# Patient Record
Sex: FEMALE | Race: WHITE | Employment: FULL TIME | URBAN - METROPOLITAN AREA
[De-identification: names, ages, dates, MRNs, and addresses within clinical notes are randomized per-mention and may not be internally consistent; named-entity substitution may affect disease eponyms.]

---

## 2022-05-17 ENCOUNTER — HOSPITAL ENCOUNTER (OUTPATIENT)
Dept: PHYSICAL THERAPY | Age: 32
Discharge: HOME | End: 2022-05-17
Payer: COMMERCIAL

## 2022-05-17 PROCEDURE — 97161 PT EVAL LOW COMPLEX 20 MIN: CPT

## 2022-05-17 NOTE — PROGRESS NOTES
W . [address] [place] [address], [address]  Phone: [phone]   Fax: [phone]    PT INITIAL EVALUATION NOTE - MCR 2-15  Plan of Care/Statement of Necessity for Physical Therapy Services  2-15    Patient Name: Ab Leach  Date:2022  : 1990  [x]  Patient  Verified  Provider#: [id number]  Payor: Troy Savage / Plan: [facility] [address] / Product Type: PPO /    Referral source: Vidal Bhakta MD   In time:330  Out time:430  Total Treatment Time (min): 60  Total Timed Codes (min): 0     Visit #: 1      Start of Care: 22      Onset Date: surgery 21     Treatment Area/Diagnosis: Pain in right foot [M79.671]    SUBJECTIVE  Pain Level (0-10 scale): 5                Best: 2           Worst:  8  Description: poor ADL TOLERANCE. DEPENDENT ON BOOT. BURNS ALONG POST. TIB. HAVE TO BE ON LITE DUTY WORK. 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 Living Situation:  AND KIDS   Pt Goals: TOLERATE [address]RAIN, FULL WORK, NO BOOT  Barriers: NONE  Motivation: WNL  Substance use: NONE  FABQ Score: AMPAC=0%  Cognition: A & O x WNL        OBJECTIVE/EXAMINATION  TALL. LEAN. POSTURE WNL. CAM BOOT. SOME ATROPHY RIGHT CALF. NAVICULAR PROTRUDING RIGHT ARCH NOT LEFT. HEALED SCARS BUT HYPERSENSITIVE TO TOUCH. ARCH NORMAL ON LEFT, MILDLY DROOPING ON RIGHT. MMT LEFT FOOT WNL. RIGHT FOOT 3+/5. ROM PL. FLEX INVERSION/EVERSION ABOUT THE SAME AS LEFT.  DORSIFLEX UNABLE TO TOLERATE FULL TEST BUT HAS SOME PAST NEUTRAL. SUPINE; NO LLD. KNEE ALIGNMENT WNL. PROE; PROM HIPS AND KNEES FULL WNL. WALK ON TOES MARGINAL ON RIGHT. WALK ON HEELS MARGINAL ON RIGHT. WNL LEFT. NO DISCOLORATION. NO EDEMA. With   [x] TE   [] TA   [] neuro   [] other: Patient Education: [x] Review HEP  DAILY AFTER WORK: NWB ABCs; STAND SUPPORTED MARCH R/L 1 MIN.; LIGHT RETROGRADE FOOT SCAR MASSAGE (LIKE FOR LYMPHEDEMA)[] Progressed/Changed HEP based on:   [] positioning   [] body mechanics   [] transfers   [] heat/ice application    [] other:        TUG: NORMAL IN BOOT    Pain Level (0-10 scale) post treatment: 5    ASSESSMENT/Key Information/Changes in Function:   IN BOOT YR AND HALF. UNABLE SO FAR TO GET ENOUGH FOOT/ANKLE STRENGTH/ENDURANCE TO [address] AND RN IN CICU. Problem List: pain affecting function, decrease ROM, decrease strength, edema affecting function, impaired gait/ balance, decrease ADL/ functional abilitiies, decrease activity tolerance, decrease flexibility/ joint mobility and decrease transfer abilities    Short Term Goals: To be accomplished in 4 weeks:   KEEP PAIN RATINGS <6/10              TOLERATE WORKING W/O BOOT BUT STILL IN BOOT OUTSIDE WORK  Long Term Goals: To be accomplished in 8 weeks:   [address]. FULL WORK NOT LITE DUTY. Patient Goal (s): FULL RETURN TO WORK.  WALK ON UNEVEN TERRAIN WFL    Evaluation Complexity History LOW Complexity : Zero comorbidities / personal factors that will impact the outcome / POC; Examination MEDIUM Complexity : 3 Standardized tests and measures addressing body structure, function, activity limitation and / or participation in recreation  ;Presentation LOW Complexity : Stable, uncomplicated  ;Clinical Decision Making LOW Complexity : FOTO score of   Overall Complexity Rating: MEDIUM     Patient / Family readiness to learn indicated by: asking questions  Persons(s) to be included in education: patient (P)  Barriers to Learning/Limitations: None  Patient Self Reported Health Status: good  Rehabilitation Potential: good  Patient/ Caregiver education and instruction: brace/ splint application and exercises      PLAN:  Treatment Plan may include any combination of the following: Therapeutic exercise, Therapeutic activities, Physical agent/modality, Gait/balance training and Manual therapy  HEP Issued: SEE ABOVE    Frequency / Duration: Patient to be seen 2 times per week for 8 weeks. [x]  Plan of care has been reviewed with PTA    Certification Period: 5/17/22  to  8/16/22    Matthew Redd, PT 5/17/2022     ________________________________________________________________________    I certify that the above Therapy Services are being furnished while the patient is under my care. I agree with the treatment plan and certify that this therapy is necessary.     Physician's Signature:____________________  Date:____________Time: _________            Wyatt Villagomez MD

## 2022-05-25 ENCOUNTER — HOSPITAL ENCOUNTER (OUTPATIENT)
Dept: PHYSICAL THERAPY | Age: 32
Discharge: HOME | End: 2022-05-25
Payer: COMMERCIAL

## 2022-05-25 PROCEDURE — 97110 THERAPEUTIC EXERCISES: CPT

## 2022-05-25 NOTE — PROGRESS NOTES
PT DAILY TREATMENT NOTE - [facility] 2-15    Patient Name: Jourdan Gregg  Date:2022  : 1990  [x]  Patient  Verified  Payor: Joellen Dent / Plan: [employer] [facility] [address] / Product Type: PPO /    In time:2pm  Out time:245pm  Total Treatment Time (min): 45  Total Timed Codes (min): 39   Visit #:  2    Treatment Area: Pain in right foot [W04.272]    SUBJECTIVE  Pain Level (0-10 scale): 5  Any medication changes, allergies to medications, adverse drug reactions, diagnosis change, or new procedure performed?: [x] No    [] Yes (see summary sheet for update)  Subjective functional status/changes:   [] No changes reported  Patient states that she wears her boot when shes up walking around. She states that her foot hurts the most when shes walking.     OBJECTIVE    Modality rationale: decrease edema, decrease inflammation and decrease pain to improve the patients ability to complete ADLs   Min Type Additional Details       [] Estim: []Att   []Unatt    []TENS instruct                  []IFC  []Premod   []NMES                     []Other:  []w/US   []w/ice   []w/heat  Position:  Location:       []  Traction: [] Cervical       []Lumbar                       [] Prone          []Supine                       []Intermittent   []Continuous Lbs:  [] before manual  [] after manual  []w/heat    []  Ultrasound: []Continuous   [] Pulsed                       at: []1MHz   []3MHz Location:  W/cm2:    [] Paraffin         Location:   []w/heat    []  Ice     []  Heat  []  Ice massage Position:  Location:    []  Laser  []  Other: Position:  Location:      []  Vasopneumatic Device Pressure:       [] lo [] med [] hi   Temperature:      [x] Skin assessment post-treatment:  [x]intact []redness- no adverse reaction    []redness - adverse reaction:      39 min Therapeutic Exercise:  [x] See flow sheet :   Rationale: increase ROM and increase strength to improve the patients ability to complete ADLs     min Manual Therapy: Rationale: decrease pain, increase ROM and increase tissue extensibility to improve the patients ability to complete ADLs. With   [] TE   [] TA   [] neuro   [] other: Patient Education: [x] Review HEP    [] Progressed/Changed HEP based on:   [] positioning   [] body mechanics   [] transfers   [] heat/ice application    [] other:      Other Objective/Functional Measures: foot supination on the right side with gait. Pain Level (0-10 scale) post treatment: 2    ASSESSMENT/Changes in Function:   Patient tolerated session well today with focus on strengthening intrinsics within her foot. She was able to complete a smooth heel/toe gait while using the upwalker at 50% WB and slowed priyanka. Keep work loads to 50% to reduce prolonged discomfort. Patient was given HEP and was able to verbalize and demonstrate understanding. Patient will continue to benefit from skilled PT services to modify and progress therapeutic interventions, address functional mobility deficits, address ROM deficits, address strength deficits and analyze and address soft tissue restrictions to attain remaining goals. [x]  See Plan of Care  []  See progress note/recertification  []  See Discharge Summary         Progress towards goals / Updated goals:  Problem List: pain affecting function, decrease ROM, decrease strength, edema affecting function, impaired gait/ balance, decrease ADL/ functional abilitiies, decrease activity tolerance, decrease flexibility/ joint mobility and decrease transfer abilities     Short Term Goals: To be accomplished in 4 weeks:              KEEP PAIN RATINGS <6/10              TOLERATE WORKING W/O BOOT BUT STILL IN BOOT OUTSIDE WORK  Long Term Goals: To be accomplished in 8 weeks:              [address]. FULL WORK NOT LITE DUTY. Patient Goal (s): FULL RETURN TO WORK.  WALK ON UNEVEN TERRAIN WFL    PLAN  [x]  Upgrade activities as tolerated     [x]  Continue plan of care  []  Update interventions per flow sheet       []  Discharge due to:_  []  Other:_      Klever Tate., PTA 5/25/2022

## 2022-05-31 ENCOUNTER — HOSPITAL ENCOUNTER (OUTPATIENT)
Dept: PHYSICAL THERAPY | Age: 32
Discharge: HOME | End: 2022-05-31
Payer: COMMERCIAL

## 2022-05-31 PROCEDURE — 97110 THERAPEUTIC EXERCISES: CPT

## 2022-05-31 PROCEDURE — 97140 MANUAL THERAPY 1/> REGIONS: CPT

## 2022-05-31 NOTE — PROGRESS NOTES
PT DAILY TREATMENT NOTE - [facility] 2-15    Patient Name: Landen Giraldo  Date:2022  : 1990  [x]  Patient  Verified  Payor: Kali  / Plan: ERVIN GRACIA [facility] [address] / Product Type: PPO /    In time:245 pm  Out time:330  pm  Total Treatment Time (min): 45  Total Timed Codes (min): 45  Visit #:  3    Treatment Area: Pain in right foot [M79.851]    SUBJECTIVE  Pain Level (0-10 scale): 5  Any medication changes, allergies to medications, adverse drug reactions, diagnosis change, or new procedure performed?: [x] No    [] Yes (see summary sheet for update)  Subjective functional status/changes:   [] No changes reported  Patient states that she wears her boot when shes up walking around. She states that her foot hurts the most when shes walking. Came into PT today in street shoe; slow and mild limp. OBJECTIVE         10 min Therapeutic Exercise:  [x] See flow sheet :   Rationale: increase ROM and increase strength to improve the patients ability to complete ADLs    35 min Manual Therapy:     Rationale: decrease pain, increase ROM and increase tissue extensibility to improve the patients ability to complete ADLs. With   [x] TE   [] TA   [] neuro   [] other: Patient Education: [x] Review HEP  Looking into getting Aircast; arom, towel scrunches, marble pickups  [] Progressed/Changed HEP based on:   [] positioning   [] body mechanics   [] transfers   [] heat/ice application    [] other:        Pain Level (0-10 scale) post treatment: 3    ASSESSMENT/Changes in Function:   Manually resisted toe/foot /ankle exercises went well after she got warmed up. Click palpable if she goes towards full dorsiflexion. Patient will continue to benefit from skilled PT services to modify and progress therapeutic interventions, address functional mobility deficits, address ROM deficits, address strength deficits and analyze and address soft tissue restrictions to attain remaining goals.      Progress towards goals / Updated goals:  Problem List: pain affecting function, decrease ROM, decrease strength, edema affecting function, impaired gait/ balance, decrease ADL/ functional abilitiies, decrease activity tolerance, decrease flexibility/ joint mobility and decrease transfer abilities     Short Term Goals: To be accomplished in 4 weeks:              KEEP PAIN RATINGS <6/10              TOLERATE WORKING W/O BOOT BUT STILL IN BOOT OUTSIDE WORK  Long Term Goals: To be accomplished in 8 weeks:              [address]. FULL WORK NOT LITE DUTY. Patient Goal (s): FULL RETURN TO WORK.  WALK ON [address] L    PLAN  [x]  Upgrade activities as tolerated     [x]  Continue plan of care  []  Update interventions per flow sheet       []  Discharge due to:_  []  Other:_      Any Grullon, PT 5/31/2022

## 2022-06-03 ENCOUNTER — HOSPITAL ENCOUNTER (OUTPATIENT)
Dept: PHYSICAL THERAPY | Age: 32
Discharge: HOME | End: 2022-06-03
Payer: COMMERCIAL

## 2022-06-03 PROCEDURE — 97110 THERAPEUTIC EXERCISES: CPT

## 2022-06-03 NOTE — PROGRESS NOTES
PT DAILY TREATMENT NOTE - [facility] 2-15    Patient Name: Debbi Tirado  Date:6/3/2022  : 1990  [x]  Patient  Verified  Payor: Pernell Gregg / Plan: [employer] [address] / Product Type: PPO /    In time:230 pm  Out time: 315 pm  Total Treatment Time (min): 45  Total Timed Codes (min): 39  Visit #:  4    Treatment Area: Pain in right foot [M79.672]    SUBJECTIVE  Pain Level (0-10 scale): 4  Any medication changes, allergies to medications, adverse drug reactions, diagnosis change, or new procedure performed?: [x] No    [] Yes (see summary sheet for update)  Subjective functional status/changes:   [] No changes reported  Patient states that she wears her boot when shes up walking around. She states that her foot hurts the most when shes walking. Came into PT today in street shoe; slow and mild limp. OBJECTIVE         39 min Therapeutic Exercise:  [x] See flow sheet :   Rationale: increase ROM and increase strength to improve the patients ability to complete ADLs     min Manual Therapy:     Rationale: decrease pain, increase ROM and increase tissue extensibility to improve the patients ability to complete ADLs. With   [x] TE   [] TA   [] neuro   [] other: Patient Education: [x] Review HEP  Looking into getting Aircast; arom, towel scrunches, marble pickups  [] Progressed/Changed HEP based on:   [] positioning   [] body mechanics   [] transfers   [] heat/ice application    [] other:        Pain Level (0-10 scale) post treatment: 6    ASSESSMENT/Changes in Function:   Pt tolerated session well today, had mild discomfort with a few exercises but was able to recover and continue session. Pt declined ice post session, and reported her pain to be higher as she walked.   Patient will continue to benefit from skilled PT services to modify and progress therapeutic interventions, address functional mobility deficits, address ROM deficits, address strength deficits and analyze and address soft tissue restrictions to attain remaining goals. Progress towards goals / Updated goals:  Problem List: pain affecting function, decrease ROM, decrease strength, edema affecting function, impaired gait/ balance, decrease ADL/ functional abilitiies, decrease activity tolerance, decrease flexibility/ joint mobility and decrease transfer abilities     Short Term Goals: To be accomplished in 4 weeks:              KEEP PAIN RATINGS <6/10              TOLERATE WORKING W/O BOOT BUT STILL IN BOOT OUTSIDE WORK  Long Term Goals: To be accomplished in 8 weeks:              [address]. FULL WORK NOT LITE DUTY. Patient Goal (s): FULL RETURN TO WORK.  WALK ON [address] L    PLAN  [x]  Upgrade activities as tolerated     [x]  Continue plan of care  []  Update interventions per flow sheet       []  Discharge due to:_  []  Other:_      Nataly Florez., PTA 6/3/2022

## 2022-06-14 ENCOUNTER — APPOINTMENT (OUTPATIENT)
Dept: PHYSICAL THERAPY | Age: 32
End: 2022-06-14
Payer: COMMERCIAL

## 2022-06-22 ENCOUNTER — APPOINTMENT (OUTPATIENT)
Dept: PHYSICAL THERAPY | Age: 32
End: 2022-06-22
Payer: COMMERCIAL

## 2022-06-27 ENCOUNTER — HOSPITAL ENCOUNTER (OUTPATIENT)
Dept: PHYSICAL THERAPY | Age: 32
Discharge: HOME | End: 2022-06-27
Payer: COMMERCIAL

## 2022-06-27 PROCEDURE — 97110 THERAPEUTIC EXERCISES: CPT

## 2022-06-27 NOTE — PROGRESS NOTES
PT DAILY TREATMENT NOTE - [facility] 2-15    Patient Name: Satnana Douglas  Date:2022  : 1990  [x]  Patient  Verified  Payor: Merline Grow / Plan: Jocelyn Cortez / Product Type: PPO /    In time:417 pm  Out time: 5 pm  Total Treatment Time (min): 43  Total Timed Codes (min): 35  Visit #:  5    Treatment Area: Pain in right foot [M79.284]    SUBJECTIVE  Pain Level (0-10 scale): 3 at rest, 7 walking  Any medication changes, allergies to medications, adverse drug reactions, diagnosis change, or new procedure performed?: [x] No    [] Yes (see summary sheet for update)  Subjective functional status/changes:   [] No changes reported  Pt states that recently she has been having pain at rest which last for a few hours. She reports that her air cast hasnt made her foot pain any better. OBJECTIVE         35 min Therapeutic Exercise:  [x] See flow sheet :   Rationale: increase ROM and increase strength to improve the patients ability to complete ADLs     min Manual Therapy:     Rationale: decrease pain, increase ROM and increase tissue extensibility to improve the patients ability to complete ADLs. With   [x] TE   [] TA   [] neuro   [] other: Patient Education: [x] Review HEP  Looking into getting Aircast; arom, towel scrunches, marble pickups  [] Progressed/Changed HEP based on:   [] positioning   [] body mechanics   [] transfers   [] heat/ice application    [] other:        Pain Level (0-10 scale) post treatment: 7 walking    ASSESSMENT/Changes in Function:   Pt arrived to therapy with air cast and was able to complete exercises without any difficulty while wearing it. Pt was limited during exercises due to muscular fatigue and discomfort. Pt educated about continuing HEP and to manage how much activity she completes at home after work.  Patient will continue to benefit from skilled PT services to modify and progress therapeutic interventions, address functional mobility deficits, address ROM deficits, address strength deficits and analyze and address soft tissue restrictions to attain remaining goals. Progress towards goals / Updated goals:  Problem List: pain affecting function, decrease ROM, decrease strength, edema affecting function, impaired gait/ balance, decrease ADL/ functional abilitiies, decrease activity tolerance, decrease flexibility/ joint mobility and decrease transfer abilities     Short Term Goals: To be accomplished in 4 weeks:              KEEP PAIN RATINGS <6/10              TOLERATE WORKING W/O BOOT BUT STILL IN BOOT OUTSIDE WORK  Long Term Goals: To be accomplished in 8 weeks:              [address]. FULL WORK NOT LITE DUTY. Patient Goal (s): FULL RETURN TO WORK.  WALK ON [address] WFL    PLAN  [x]  Upgrade activities as tolerated     [x]  Continue plan of care  []  Update interventions per flow sheet       []  Discharge due to:_  []  Other:_      Brook Sanders., PTA 6/27/2022

## 2022-07-01 ENCOUNTER — APPOINTMENT (OUTPATIENT)
Dept: PHYSICAL THERAPY | Age: 32
End: 2022-07-01
Payer: COMMERCIAL

## 2022-07-06 ENCOUNTER — HOSPITAL ENCOUNTER (OUTPATIENT)
Dept: PHYSICAL THERAPY | Age: 32
Discharge: HOME | End: 2022-07-06
Payer: COMMERCIAL

## 2022-07-13 ENCOUNTER — HOSPITAL ENCOUNTER (OUTPATIENT)
Dept: PHYSICAL THERAPY | Age: 32
Discharge: HOME | End: 2022-07-13
Payer: COMMERCIAL

## 2022-07-13 PROCEDURE — 97035 APP MDLTY 1+ULTRASOUND EA 15: CPT | Performed by: PHYSICAL THERAPIST

## 2022-07-13 PROCEDURE — 97140 MANUAL THERAPY 1/> REGIONS: CPT | Performed by: PHYSICAL THERAPIST

## 2022-07-13 PROCEDURE — 97110 THERAPEUTIC EXERCISES: CPT | Performed by: PHYSICAL THERAPIST

## 2022-07-13 NOTE — PROGRESS NOTES
PT DAILY TREATMENT NOTE - [facility] 2-15    Patient Name: Jamar Florez  Date:2022  : 1990  [x]  Patient  Verified  Payor: Marya Ornelas / Plan: [employer] / Product Type: PPO /    In time:415 pm  Out time: 515 pm  Total Treatment Time (min): 50  Total Timed Codes (min): 43  Visit #:  8  Treatment Area: Pain in right foot [M28.637]    SUBJECTIVE  Pain Level (0-10 scale): 4 walking, 0 at rest.   Any medication changes, allergies to medications, adverse drug reactions, diagnosis change, or new procedure performed?: [x] No    [] Yes (see summary sheet for update)  Subjective functional status/changes:   [] No changes reported    OBJECTIVE              Modality rationale: decrease edema and decrease pain to improve the patients ability to move with greater comfort. Min Type Additional Details      0 []? Estim: []? Att   []? Unatt    [x]? TENS instruct                  []?IFC  []? Premod   []? NMES                     []?Other:  []?w/US   []?w/ice   []?w/heat  Position: reclined  Location: right anterior thigh.           []? Traction: []? Cervical       []? Lumbar                       []? Prone          []? Supine                       []?Intermittent   []? Continuous Lbs:  []? before manual  []? after manual  []?w/heat    6 [x]? Ultrasound: [x]? Continuous   []? Pulsed                       at: [x]? 1MHz   []? 3MHz Location:  Medial ankle  W/cm2: 1.2     []? Paraffin         Location:   []?w/heat    [x]? Ice     []? Heat  []? Ice massage Position:  reclined  Location: right anterior hip      []? Laser  []? Other: Position:  Location:        []? Vasopneumatic Device Pressure:       []? lo []? med []? hi   Temperature:       [x]? Skin assessment post-treatment:  [x]? intact []? redness- no adverse reaction    []? redness - adverse reaction:     26 min Therapeutic Exercise:  [x] See flow sheet :   Rationale: increase ROM and increase strength to improve the patients ability to complete ADLs    15 min Manual Therapy:  Joint mobs: metatarsals/TC, midfoot, calcaneous. STM over scar area. Rationale: decrease pain, increase ROM and increase tissue extensibility to improve the patients ability to complete ADLs. With   [x] TE   [] TA   [] neuro   [] other: Patient Education: [x] Review HEP  Clam 1 and 2, bridges, SL hip abd, ball/band. Standing hip rolls toward the right. [] Progressed/Changed HEP based on:   [] positioning   [] body mechanics   [] transfers   [] heat/ice application    [] other:        Pain Level (0-10 scale) post treatment: 6 walking    Note:  Decrease TC posterior glide and calcaneal IV/EV    ASSESSMENT/Changes in Function:   Ortho appointment moved up to August 11, 2022. Program modification to include hip strengthening. Modest increase in ankle soreness s/p program today. Patient will continue to benefit from skilled PT services to modify and progress therapeutic interventions, address functional mobility deficits, address ROM deficits, address strength deficits and analyze and address soft tissue restrictions to attain remaining goals. Progress towards goals / Updated goals:  Problem List: pain affecting function, decrease ROM, decrease strength, edema affecting function, impaired gait/ balance, decrease ADL/ functional abilitiies, decrease activity tolerance, decrease flexibility/ joint mobility and decrease transfer abilities     Short Term Goals: To be accomplished in 4 weeks:              KEEP PAIN RATINGS <6/10              TOLERATE WORKING W/O BOOT BUT STILL IN BOOT OUTSIDE WORK  Long Term Goals: To be accomplished in 8 weeks:              [address]. FULL WORK NOT LITE DUTY. Patient Goal (s): FULL RETURN TO WORK.  WALK ON [address] WFL    PLAN  [x]  Upgrade activities as tolerated     [x]  Continue plan of care  []  Update interventions per flow sheet       []  Discharge due to:_  []  Other:_    Patient was informed and agreed to allow student to observe and participate in medical care. Patient was seen by student and licensed therapist who is in agreement with the above documentation.     Terrence Merlin, [place] 7/6/2022  Hannah Fonseca PT 7/13/2022

## 2022-07-22 ENCOUNTER — HOSPITAL ENCOUNTER (OUTPATIENT)
Dept: PHYSICAL THERAPY | Age: 32
Discharge: HOME | End: 2022-07-22
Payer: COMMERCIAL

## 2022-07-22 PROCEDURE — 97110 THERAPEUTIC EXERCISES: CPT

## 2022-07-22 PROCEDURE — 97035 APP MDLTY 1+ULTRASOUND EA 15: CPT

## 2022-07-22 NOTE — PROGRESS NOTES
PT DAILY TREATMENT NOTE - [facility] 2-15    Patient Name: Joellen Alcocer  Date:2022  : 1990  [x]  Patient  Verified  Payor: Chloe Patterson / Plan: [employer] [facility] [address] / Product Type: PPO /    In time:400 pm  Out time: 445 pm  Total Treatment Time (min): 45  Total Timed Codes (min): 40  Visit #:  9  Treatment Area: Pain in right foot [M79.752]    SUBJECTIVE  Pain Level (0-10 scale): 6 walking, 0 at rest.   Any medication changes, allergies to medications, adverse drug reactions, diagnosis change, or new procedure performed?: [x] No    [] Yes (see summary sheet for update)  Subjective functional status/changes:   [] No changes reported  Pain in arch especially with walking sideways. Doing hip exercises at home. OBJECTIVE            Modality rationale: decrease edema and decrease pain to improve the patients ability to move with greater comfort. Min Type Additional Details      0 [] Estim: []Att   []Unatt    [x]TENS instruct                  []IFC  []Premod   []NMES                     []Other:  []w/US   []w/ice   []w/heat  Position: reclined  Location: right anterior thigh.            []  Traction: [] Cervical       []Lumbar                       [] Prone          []Supine                       []Intermittent   []Continuous Lbs:  [] before manual  [] after manual  []w/heat    8 [x]  Ultrasound: [x]Continuous   [] Pulsed                       at: [x]1MHz   []3MHz Location:  Medial ankle  W/cm2: 1.2     [] Paraffin         Location:   []w/heat    [x]  Ice     []  Heat  []  Ice massage Position:  reclined  Location: right anterior hip      []  Laser  []  Other: Position:  Location:        []  Vasopneumatic Device Pressure:       [] lo [] med [] hi   Temperature:       [x] Skin assessment post-treatment:  [x]intact []redness- no adverse reaction    []redness - adverse reaction:     32 min Therapeutic Exercise:  [x] See flow sheet :   Rationale: increase ROM and increase strength to improve the patients ability to complete ADLs     min Manual Therapy:  Joint mobs: metatarsals/TC, midfoot, calcaneous. STM over scar area. Rationale: decrease pain, increase ROM and increase tissue extensibility to improve the patients ability to complete ADLs. With   [x] TE   [] TA   [] neuro   [] other: Patient Education: [x] Review HEP  Clam 1 and 2, bridges, SL hip abd, ball/band. Standing hip rolls toward the right. [] Progressed/Changed HEP based on:   [] positioning   [] body mechanics   [] transfers   [] heat/ice application    [] other:        Pain Level (0-10 scale) post treatment: 5 walking    ASSESSMENT/Changes in Function:   Ortho appointment moved up to August 11, 2022. Tender along medial ankle/foot. Modest increase in ankle soreness s/p program today. Patient will continue to benefit from skilled PT services to modify and progress therapeutic interventions, address functional mobility deficits, address ROM deficits, address strength deficits and analyze and address soft tissue restrictions to attain remaining goals. Progress towards goals / Updated goals:  Problem List: pain affecting function, decrease ROM, decrease strength, edema affecting function, impaired gait/ balance, decrease ADL/ functional abilitiies, decrease activity tolerance, decrease flexibility/ joint mobility and decrease transfer abilities     Short Term Goals: To be accomplished in 4 weeks:              KEEP PAIN RATINGS <6/10              TOLERATE WORKING W/O BOOT BUT STILL IN BOOT OUTSIDE WORK  Long Term Goals: To be accomplished in 8 weeks:              [address]. FULL WORK NOT LITE DUTY. Patient Goal (s): FULL RETURN TO WORK.  WALK ON [address] TERRAIN WFL    PLAN  [x]  Upgrade activities as tolerated     [x]  Continue plan of care  []  Update interventions per flow sheet       []  Discharge due to:_  []  Other:_      Davis Landau, PT 7/22/2022

## 2022-07-27 ENCOUNTER — APPOINTMENT (OUTPATIENT)
Dept: PHYSICAL THERAPY | Age: 32
End: 2022-07-27
Payer: COMMERCIAL

## 2022-08-05 ENCOUNTER — APPOINTMENT (OUTPATIENT)
Dept: PHYSICAL THERAPY | Age: 32
End: 2022-08-05

## 2022-10-27 NOTE — THERAPY DISCHARGE
[address], [address] [facility]  Phone: [phone]   Fax: [phone]    Discharge Summary  2-15    Patient name: Mary Simon  : 1990  Provider#: [id number]  Referral source: Daniel Johnston MD      Medical/Treatment Diagnosis: Pain in right foot [M79.671]     Prior Hospitalization: see medical history     Comorbidities: See Plan of Care  Prior Level of Function:See Plan of Care  Medications: Verified on Patient Summary List    Start of Care: 22      Onset Date:21   Visits from Start of Care: 9     Missed Visits: LOST TO F/UP  Reporting Period : 22 to 10/27/22      ASSESSMENT/SUMMARY OF CARE: LOST TO FOLLOW UP; STATUS UNKNOWN    FOTO Functional Measure: 0/100  Discharge  0/100  Intake  IN SUMMARY; SOME PROGRESS WITH AIRCAST BUT GOALS NOT ACHIEVED:  Short Term Goals: To be accomplished in 4 weeks:              KEEP PAIN RATINGS <6/10              TOLERATE WORKING W/O BOOT BUT STILL IN BOOT OUTSIDE WORK  Long Term Goals: To be accomplished in 8 weeks:              [address]. FULL WORK NOT LITE DUTY. Patient Goal (s): FULL RETURN TO WORK.  WALK ON [address] TERRAIN L      RECOMMENDATIONS:  [x]Discontinue therapy: []Patient has reached or is progressing toward set goals      []Patient is non-compliant or has abdicated      []Due to lack of appreciable progress towards set goals      [x]Other   LOST TO F/UP    Roel Rouse, PT 10/27/2022

## 2023-01-11 RX ORDER — ESCITALOPRAM OXALATE 5 MG/1
5 TABLET ORAL DAILY
COMMUNITY

## 2023-01-11 RX ORDER — TRAZODONE HYDROCHLORIDE 50 MG/1
50 TABLET ORAL
COMMUNITY

## 2023-01-16 ENCOUNTER — ANESTHESIA (OUTPATIENT)
Dept: SURGERY | Age: 33
End: 2023-01-16
Payer: COMMERCIAL

## 2023-01-16 ENCOUNTER — ANESTHESIA EVENT (OUTPATIENT)
Dept: SURGERY | Age: 33
End: 2023-01-16
Payer: COMMERCIAL

## 2023-01-16 ENCOUNTER — HOSPITAL ENCOUNTER (OUTPATIENT)
Age: 33
Discharge: HOME | End: 2023-01-16
Attending: ORTHOPAEDIC SURGERY | Admitting: ORTHOPAEDIC SURGERY
Payer: COMMERCIAL

## 2023-01-16 VITALS
DIASTOLIC BLOOD PRESSURE: 72 MMHG | OXYGEN SATURATION: 97 % | HEIGHT: 69 IN | SYSTOLIC BLOOD PRESSURE: 124 MMHG | RESPIRATION RATE: 16 BRPM | TEMPERATURE: 98.4 F | HEART RATE: 83 BPM | BODY MASS INDEX: 25.18 KG/M2 | WEIGHT: 170 LBS

## 2023-01-16 DIAGNOSIS — Q74.2 ACCESSORY NAVICULAR BONE OF RIGHT FOOT: Primary | ICD-10-CM

## 2023-01-16 LAB — HCG UR QL: NEGATIVE

## 2023-01-16 PROCEDURE — C1713 ANCHOR/SCREW BN/BN,TIS/BN: HCPCS | Performed by: ORTHOPAEDIC SURGERY

## 2023-01-16 PROCEDURE — 74011000250 HC RX REV CODE- 250: Performed by: NURSE ANESTHETIST, CERTIFIED REGISTERED

## 2023-01-16 PROCEDURE — 77030008684 HC TU ET CUF COVD -B: Performed by: ANESTHESIOLOGY

## 2023-01-16 PROCEDURE — 74011000250 HC RX REV CODE- 250: Performed by: ORTHOPAEDIC SURGERY

## 2023-01-16 PROCEDURE — 81025 URINE PREGNANCY TEST: CPT

## 2023-01-16 PROCEDURE — 77030039135 HC KT DISP FIBRTAK ARTH -D: Performed by: ORTHOPAEDIC SURGERY

## 2023-01-16 PROCEDURE — 76060000035 HC ANESTHESIA 2 TO 2.5 HR: Performed by: ORTHOPAEDIC SURGERY

## 2023-01-16 PROCEDURE — 74011250636 HC RX REV CODE- 250/636: Performed by: NURSE ANESTHETIST, CERTIFIED REGISTERED

## 2023-01-16 PROCEDURE — 77030018074 HC RTVR SUT ARTH4 S&N -B: Performed by: ORTHOPAEDIC SURGERY

## 2023-01-16 PROCEDURE — 77030040361 HC SLV COMPR DVT MDII -B: Performed by: ORTHOPAEDIC SURGERY

## 2023-01-16 PROCEDURE — 76010000131 HC OR TIME 2 TO 2.5 HR: Performed by: ORTHOPAEDIC SURGERY

## 2023-01-16 PROCEDURE — 77030025304 HC SUT FBRWRE 1 ARTH -B: Performed by: ORTHOPAEDIC SURGERY

## 2023-01-16 PROCEDURE — 2709999900 HC NON-CHARGEABLE SUPPLY: Performed by: ORTHOPAEDIC SURGERY

## 2023-01-16 PROCEDURE — 74011250637 HC RX REV CODE- 250/637: Performed by: ORTHOPAEDIC SURGERY

## 2023-01-16 PROCEDURE — 77030026438 HC STYL ET INTUB CARD -A: Performed by: ANESTHESIOLOGY

## 2023-01-16 PROCEDURE — 77030031139 HC SUT VCRL2 J&J -A: Performed by: ORTHOPAEDIC SURGERY

## 2023-01-16 PROCEDURE — 74011250636 HC RX REV CODE- 250/636: Performed by: ORTHOPAEDIC SURGERY

## 2023-01-16 PROCEDURE — 76210000006 HC OR PH I REC 0.5 TO 1 HR: Performed by: ORTHOPAEDIC SURGERY

## 2023-01-16 PROCEDURE — 74011250636 HC RX REV CODE- 250/636: Performed by: ANESTHESIOLOGY

## 2023-01-16 PROCEDURE — 77030013079 HC BLNKT BAIR HGGR 3M -A: Performed by: ANESTHESIOLOGY

## 2023-01-16 PROCEDURE — 76210000022 HC REC RM PH II 1.5 TO 2 HR: Performed by: ORTHOPAEDIC SURGERY

## 2023-01-16 DEVICE — IMPLANTABLE DEVICE: Type: IMPLANTABLE DEVICE | Site: ANKLE | Status: FUNCTIONAL

## 2023-01-16 DEVICE — DEVICE GRFT FIX FOR LIGMNT AUG ANCHR REP PEEK INT BRAC: Type: IMPLANTABLE DEVICE | Site: ANKLE | Status: FUNCTIONAL

## 2023-01-16 DEVICE — ANCHOR SUT L19.1MM DIA4.75MM PEEK FULL THRD KNOTLESS EYELET: Type: IMPLANTABLE DEVICE | Site: ANKLE | Status: FUNCTIONAL

## 2023-01-16 RX ORDER — OXYCODONE HYDROCHLORIDE 5 MG/1
5 TABLET ORAL
Qty: 20 TABLET | Refills: 0 | Status: SHIPPED | OUTPATIENT
Start: 2023-01-16 | End: 2023-01-23

## 2023-01-16 RX ORDER — DIPHENHYDRAMINE HYDROCHLORIDE 50 MG/ML
12.5 INJECTION, SOLUTION INTRAMUSCULAR; INTRAVENOUS AS NEEDED
Status: DISCONTINUED | OUTPATIENT
Start: 2023-01-16 | End: 2023-01-16 | Stop reason: HOSPADM

## 2023-01-16 RX ORDER — SODIUM CHLORIDE 0.9 % (FLUSH) 0.9 %
5-40 SYRINGE (ML) INJECTION AS NEEDED
Status: DISCONTINUED | OUTPATIENT
Start: 2023-01-16 | End: 2023-01-16 | Stop reason: HOSPADM

## 2023-01-16 RX ORDER — GLYCOPYRROLATE 0.2 MG/ML
INJECTION INTRAMUSCULAR; INTRAVENOUS AS NEEDED
Status: DISCONTINUED | OUTPATIENT
Start: 2023-01-16 | End: 2023-01-16 | Stop reason: HOSPADM

## 2023-01-16 RX ORDER — MIDAZOLAM HYDROCHLORIDE 1 MG/ML
1 INJECTION, SOLUTION INTRAMUSCULAR; INTRAVENOUS AS NEEDED
Status: DISCONTINUED | OUTPATIENT
Start: 2023-01-16 | End: 2023-01-16 | Stop reason: HOSPADM

## 2023-01-16 RX ORDER — LIDOCAINE HYDROCHLORIDE 10 MG/ML
0.1 INJECTION, SOLUTION EPIDURAL; INFILTRATION; INTRACAUDAL; PERINEURAL AS NEEDED
Status: DISCONTINUED | OUTPATIENT
Start: 2023-01-16 | End: 2023-01-16 | Stop reason: HOSPADM

## 2023-01-16 RX ORDER — ONDANSETRON 2 MG/ML
INJECTION INTRAMUSCULAR; INTRAVENOUS AS NEEDED
Status: DISCONTINUED | OUTPATIENT
Start: 2023-01-16 | End: 2023-01-16 | Stop reason: HOSPADM

## 2023-01-16 RX ORDER — BUPIVACAINE HYDROCHLORIDE 5 MG/ML
INJECTION, SOLUTION PERINEURAL AS NEEDED
Status: DISCONTINUED | OUTPATIENT
Start: 2023-01-16 | End: 2023-01-16 | Stop reason: HOSPADM

## 2023-01-16 RX ORDER — OXYCODONE AND ACETAMINOPHEN 5; 325 MG/1; MG/1
1 TABLET ORAL AS NEEDED
Status: DISCONTINUED | OUTPATIENT
Start: 2023-01-16 | End: 2023-01-16 | Stop reason: HOSPADM

## 2023-01-16 RX ORDER — SODIUM CHLORIDE, SODIUM LACTATE, POTASSIUM CHLORIDE, CALCIUM CHLORIDE 600; 310; 30; 20 MG/100ML; MG/100ML; MG/100ML; MG/100ML
INJECTION, SOLUTION INTRAVENOUS
Status: DISCONTINUED | OUTPATIENT
Start: 2023-01-16 | End: 2023-01-16 | Stop reason: HOSPADM

## 2023-01-16 RX ORDER — CEFAZOLIN SODIUM 1 G/3ML
INJECTION, POWDER, FOR SOLUTION INTRAMUSCULAR; INTRAVENOUS AS NEEDED
Status: DISCONTINUED | OUTPATIENT
Start: 2023-01-16 | End: 2023-01-16 | Stop reason: HOSPADM

## 2023-01-16 RX ORDER — SODIUM CHLORIDE, SODIUM LACTATE, POTASSIUM CHLORIDE, CALCIUM CHLORIDE 600; 310; 30; 20 MG/100ML; MG/100ML; MG/100ML; MG/100ML
1000 INJECTION, SOLUTION INTRAVENOUS CONTINUOUS
Status: DISCONTINUED | OUTPATIENT
Start: 2023-01-16 | End: 2023-01-16 | Stop reason: HOSPADM

## 2023-01-16 RX ORDER — LIDOCAINE HYDROCHLORIDE 20 MG/ML
INJECTION, SOLUTION EPIDURAL; INFILTRATION; INTRACAUDAL; PERINEURAL AS NEEDED
Status: DISCONTINUED | OUTPATIENT
Start: 2023-01-16 | End: 2023-01-16 | Stop reason: HOSPADM

## 2023-01-16 RX ORDER — NEOSTIGMINE METHYLSULFATE 1 MG/ML
INJECTION, SOLUTION INTRAVENOUS AS NEEDED
Status: DISCONTINUED | OUTPATIENT
Start: 2023-01-16 | End: 2023-01-16 | Stop reason: HOSPADM

## 2023-01-16 RX ORDER — MIDAZOLAM HYDROCHLORIDE 1 MG/ML
INJECTION, SOLUTION INTRAMUSCULAR; INTRAVENOUS AS NEEDED
Status: DISCONTINUED | OUTPATIENT
Start: 2023-01-16 | End: 2023-01-16 | Stop reason: HOSPADM

## 2023-01-16 RX ORDER — MIDAZOLAM HYDROCHLORIDE 1 MG/ML
0.5 INJECTION, SOLUTION INTRAMUSCULAR; INTRAVENOUS
Status: DISCONTINUED | OUTPATIENT
Start: 2023-01-16 | End: 2023-01-16 | Stop reason: HOSPADM

## 2023-01-16 RX ORDER — NORETHINDRONE AND ETHINYL ESTRADIOL 0.5-0.035
5 KIT ORAL AS NEEDED
Status: DISCONTINUED | OUTPATIENT
Start: 2023-01-16 | End: 2023-01-16 | Stop reason: HOSPADM

## 2023-01-16 RX ORDER — POVIDONE-IODINE 10 MG/G
OINTMENT TOPICAL AS NEEDED
Status: DISCONTINUED | OUTPATIENT
Start: 2023-01-16 | End: 2023-01-16 | Stop reason: HOSPADM

## 2023-01-16 RX ORDER — PROPOFOL 10 MG/ML
INJECTION, EMULSION INTRAVENOUS AS NEEDED
Status: DISCONTINUED | OUTPATIENT
Start: 2023-01-16 | End: 2023-01-16 | Stop reason: HOSPADM

## 2023-01-16 RX ORDER — SODIUM CHLORIDE 0.9 % (FLUSH) 0.9 %
5-40 SYRINGE (ML) INJECTION EVERY 8 HOURS
Status: DISCONTINUED | OUTPATIENT
Start: 2023-01-16 | End: 2023-01-16 | Stop reason: HOSPADM

## 2023-01-16 RX ORDER — HYDROMORPHONE HYDROCHLORIDE 1 MG/ML
0.5 INJECTION, SOLUTION INTRAMUSCULAR; INTRAVENOUS; SUBCUTANEOUS
Status: DISCONTINUED | OUTPATIENT
Start: 2023-01-16 | End: 2023-01-16 | Stop reason: HOSPADM

## 2023-01-16 RX ORDER — FENTANYL CITRATE 50 UG/ML
50 INJECTION, SOLUTION INTRAMUSCULAR; INTRAVENOUS AS NEEDED
Status: DISCONTINUED | OUTPATIENT
Start: 2023-01-16 | End: 2023-01-16 | Stop reason: HOSPADM

## 2023-01-16 RX ORDER — MORPHINE SULFATE 10 MG/ML
2 INJECTION, SOLUTION INTRAMUSCULAR; INTRAVENOUS
Status: DISCONTINUED | OUTPATIENT
Start: 2023-01-16 | End: 2023-01-16 | Stop reason: HOSPADM

## 2023-01-16 RX ORDER — ROCURONIUM BROMIDE 10 MG/ML
INJECTION, SOLUTION INTRAVENOUS AS NEEDED
Status: DISCONTINUED | OUTPATIENT
Start: 2023-01-16 | End: 2023-01-16 | Stop reason: HOSPADM

## 2023-01-16 RX ORDER — OXYCODONE AND ACETAMINOPHEN 5; 325 MG/1; MG/1
1 TABLET ORAL ONCE
Status: COMPLETED | OUTPATIENT
Start: 2023-01-16 | End: 2023-01-16

## 2023-01-16 RX ORDER — FENTANYL CITRATE 50 UG/ML
50 INJECTION, SOLUTION INTRAMUSCULAR; INTRAVENOUS
Status: DISCONTINUED | OUTPATIENT
Start: 2023-01-16 | End: 2023-01-16 | Stop reason: HOSPADM

## 2023-01-16 RX ORDER — FENTANYL CITRATE 50 UG/ML
INJECTION, SOLUTION INTRAMUSCULAR; INTRAVENOUS AS NEEDED
Status: DISCONTINUED | OUTPATIENT
Start: 2023-01-16 | End: 2023-01-16 | Stop reason: HOSPADM

## 2023-01-16 RX ORDER — ONDANSETRON 2 MG/ML
4 INJECTION INTRAMUSCULAR; INTRAVENOUS AS NEEDED
Status: DISCONTINUED | OUTPATIENT
Start: 2023-01-16 | End: 2023-01-16 | Stop reason: HOSPADM

## 2023-01-16 RX ORDER — DEXAMETHASONE SODIUM PHOSPHATE 4 MG/ML
INJECTION, SOLUTION INTRA-ARTICULAR; INTRALESIONAL; INTRAMUSCULAR; INTRAVENOUS; SOFT TISSUE AS NEEDED
Status: DISCONTINUED | OUTPATIENT
Start: 2023-01-16 | End: 2023-01-16 | Stop reason: HOSPADM

## 2023-01-16 RX ADMIN — SODIUM CHLORIDE, POTASSIUM CHLORIDE, SODIUM LACTATE AND CALCIUM CHLORIDE 1000 ML: 600; 310; 30; 20 INJECTION, SOLUTION INTRAVENOUS at 08:17

## 2023-01-16 RX ADMIN — CEFAZOLIN SODIUM 2 G: 1 INJECTION, POWDER, FOR SOLUTION INTRAMUSCULAR; INTRAVENOUS at 10:05

## 2023-01-16 RX ADMIN — GLYCOPYRROLATE 0.2 MG: 0.2 INJECTION INTRAMUSCULAR; INTRAVENOUS at 11:37

## 2023-01-16 RX ADMIN — MIDAZOLAM HYDROCHLORIDE 2 MG: 2 INJECTION, SOLUTION INTRAMUSCULAR; INTRAVENOUS at 09:52

## 2023-01-16 RX ADMIN — HYDROMORPHONE HYDROCHLORIDE 0.5 MG: 1 INJECTION, SOLUTION INTRAMUSCULAR; INTRAVENOUS; SUBCUTANEOUS at 12:05

## 2023-01-16 RX ADMIN — SODIUM CHLORIDE, POTASSIUM CHLORIDE, SODIUM LACTATE AND CALCIUM CHLORIDE: 600; 310; 30; 20 INJECTION, SOLUTION INTRAVENOUS at 09:40

## 2023-01-16 RX ADMIN — SODIUM CHLORIDE, POTASSIUM CHLORIDE, SODIUM LACTATE AND CALCIUM CHLORIDE: 600; 310; 30; 20 INJECTION, SOLUTION INTRAVENOUS at 10:50

## 2023-01-16 RX ADMIN — LIDOCAINE HYDROCHLORIDE 100 MG: 20 INJECTION, SOLUTION EPIDURAL; INFILTRATION; INTRACAUDAL; PERINEURAL at 09:57

## 2023-01-16 RX ADMIN — PROPOFOL 150 MG: 10 INJECTION, EMULSION INTRAVENOUS at 09:57

## 2023-01-16 RX ADMIN — DEXAMETHASONE SODIUM PHOSPHATE 4 MG: 4 INJECTION, SOLUTION INTRA-ARTICULAR; INTRALESIONAL; INTRAMUSCULAR; INTRAVENOUS; SOFT TISSUE at 10:00

## 2023-01-16 RX ADMIN — OXYCODONE AND ACETAMINOPHEN 1 TABLET: 5; 325 TABLET ORAL at 13:16

## 2023-01-16 RX ADMIN — HYDROMORPHONE HYDROCHLORIDE 0.5 MG: 1 INJECTION, SOLUTION INTRAMUSCULAR; INTRAVENOUS; SUBCUTANEOUS at 12:16

## 2023-01-16 RX ADMIN — NEOSTIGMINE METHYLSULFATE 1.5 MG: 1 INJECTION, SOLUTION INTRAVENOUS at 11:37

## 2023-01-16 RX ADMIN — FENTANYL CITRATE 50 MCG: 50 INJECTION, SOLUTION INTRAMUSCULAR; INTRAVENOUS at 09:52

## 2023-01-16 RX ADMIN — ONDANSETRON 4 MG: 2 INJECTION INTRAMUSCULAR; INTRAVENOUS at 10:00

## 2023-01-16 RX ADMIN — ROCURONIUM BROMIDE 30 MG: 10 INJECTION, SOLUTION INTRAVENOUS at 09:57

## 2023-01-16 RX ADMIN — FENTANYL CITRATE 50 MCG: 50 INJECTION, SOLUTION INTRAMUSCULAR; INTRAVENOUS at 09:58

## 2023-01-16 NOTE — ANESTHESIA POSTPROCEDURE EVALUATION
Procedure(s):  REPAIR POSTERIOR TIBIAL TENDON WITH TRANSFER AND DELTOID  LIGAMENT REPAIR. general    Anesthesia Post Evaluation      Multimodal analgesia: multimodal analgesia used between 6 hours prior to anesthesia start to PACU discharge  Patient location during evaluation: PACU  Patient participation: complete - patient participated  Level of consciousness: awake  Pain score: 0  Pain management: adequate  Airway patency: patent  Anesthetic complications: no  Cardiovascular status: acceptable  Respiratory status: acceptable  Hydration status: acceptable  Post anesthesia nausea and vomiting:  controlled  Final Post Anesthesia Temperature Assessment:  Normothermia (36.0-37.5 degrees C)      INITIAL Post-op Vital signs:   Vitals Value Taken Time   /77 01/16/23 1215   Temp 36.4 °C (97.5 °F) 01/16/23 1200   Pulse 55 01/16/23 1216   Resp 11 01/16/23 1216   SpO2 100 % 01/16/23 1216   Vitals shown include unvalidated device data.

## 2023-01-16 NOTE — PERIOP NOTES
Patient states okay to review and give discharge instructions to  her spouse Destini Alexandre or her father-in-law Izabella Cheatham.

## 2023-01-16 NOTE — PERIOP NOTES
Notified Javan VÁSQUEZ of patient's allergy to penicillins with reaction of hives noted and patient report that she thinks she has had IV ancef during past surgery with no reaction. Per Moon VÁSQUEZ, okay to continue with ancef IV as previously ordered for surgery today on 1-16-23.

## 2023-01-16 NOTE — ROUTINE PROCESS
Pt recovered on unit. Vitals stable and pt stated ready to go home. AVS reviewed with pt and pt's father in law, Abiola Friend. Both verbalized understanding. IV removed and pt discharged safely.

## 2023-01-16 NOTE — ANESTHESIA PREPROCEDURE EVALUATION
Relevant Problems   No relevant active problems       Anesthetic History   No history of anesthetic complications            Review of Systems / Medical History  Patient summary reviewed and pertinent labs reviewed    Pulmonary  Within defined limits                 Neuro/Psych   Within defined limits           Cardiovascular  Within defined limits                     GI/Hepatic/Renal  Within defined limits              Endo/Other  Within defined limits           Other Findings            Past Medical History:   Diagnosis Date    Tendonitis     right       Past Surgical History:   Procedure Laterality Date    HX ORTHOPAEDIC Right 11/2021    Tendon repair       Current Outpatient Medications   Medication Instructions    escitalopram oxalate (LEXAPRO) 5 mg, Oral, DAILY    traZODone (DESYREL) 50 mg, Oral, EVERY BEDTIME       No current facility-administered medications for this encounter. No data found.     No results found for: WBC, WBCLT, HGBPOC, HGB, HGBP, HCTPOC, HCT, PHCT, RBCH, PLT, MCV, HGBEXT, HCTEXT, PLTEXT  No results found for: NA, K, CL, CO2, AGAP, GLU, BUN, CREA, BUCR, GFRAA, GFRNA, CA, GFRAA  No results found for: APTT, PTP, INR, INREXT  No results found for: GLU, GLUCPOC      Physical Exam    Airway  Mallampati: II  TM Distance: 4 - 6 cm  Neck ROM: normal range of motion   Mouth opening: Normal     Cardiovascular    Rhythm: regular  Rate: normal         Dental  No notable dental hx       Pulmonary  Breath sounds clear to auscultation               Abdominal  GI exam deferred       Other Findings            Anesthetic Plan    ASA: 2  Anesthesia type: general    Monitoring Plan: Continuous noninvasive hemodynamic monitoring      Induction: Intravenous  Anesthetic plan and risks discussed with: Patient

## 2023-01-16 NOTE — OP NOTES
Operative Note    Patient: Jacob Cordoba  YOB: 1990  MRN: [id number]    Date of Procedure: 1/16/2023     Pre-Op Diagnosis: Posterior tibial tendinitis right foot   Post-Op Diagnosis: Posterior tibial tendinitis right foot, spring ligament tear right foot    Procedure(s):  REPAIR POSTERIOR TIBIAL TENDON, transfer of FDL, repair of the spring and deltoid ligament secondary    Surgeon(s):  Hellen Acevedo MD    Surgical Assistant: Physician Assistant: Travis Daigle PA-C    Anesthesia: General     Estimated Blood Loss (mL):  less than 50     Complications: None    Specimens: * No specimens in log *     Implants:   Implant Name Type Inv. Item Serial No.  Lot No. LRB No. Used Action   ANCHOR SUT W/1.3MM SUT TAPE -- DX FIBERTAK ANDREA - [id number] [employer] ANCHOR SUT W/1.3MM SUT TAPE -- DX FIBERTAK ANDREA  [employer] [id number] Right 1 Implanted   DEVICE GRFT FIX FOR LIGMNT [facility] REP PEEK INT BRA - [id number]  DEVICE GRFT FIX FOR LIGMNT [facility] REP PEEK INT BRAC  [employer] [id number] Right 1 Implanted   ANCHOR SUT L19.1MM DIA4. 75MM PEEK FULL THRD KNOTLESS EYELET - [id number] [employer] ANCHOR SUT L19.1MM DIA4. 75MM PEEK FULL THRD KNOTLESS EYELET  [employer] [id number] Right 1 Implanted       Drains: * No LDAs found *    Findings: Patient had a thickened insertion of the posterior tibial tendon, excursion seems normal, the FDL was transferred to strengthen the insertion, there was a tear of the spring ligament and deltoid ligament, this was secondarily repaired    Detailed Description of Procedure:   Patient was prepped and draped in the usual manner, timeout was called, antibiotics were given, DVT prophylaxis was initiated on the contralateral side, medial incision was made, this was extended proximally distally from the prior incision, the posterior tibial tendon sheath was opened, there was a thickened insertion noted, part of the scar tissue was removed at the insertion with incision was intact, excursion was normal, there was some laxity noted, the FDL tendon was identified posteriorly and this was then attached to the insertion of the posterior tibial tendon to strengthen it. The spring ligament was found to be torn, stitch was passed through the spring ligament, the anterior part of the deltoid was elevated from the medial malleolus, this was lax, and internal brace was used from the navicular to the anterior tibial and inserted, drill hole was made in the navicular and the FDL tendon which is a test side-to-side was then used to attach the navicular. The deltoid was strengthened by using a Broström and by at that reattach the anterior part of the deltoid the tibia spring component back to the medial malleolus strengthening 8, good stable repair was obtained, the internal brace was attached with pronation and reduction of the talonavicular joint, good stable repair was obtained, wounds were then closed with vancomycin and the wound-well-padded short leg cast was then applied.   Patient surgery well and left the operating room in a stable condition plan is for nonweightbearing for 6 weeks followed by protected weightbearing for 6 weeks    Electronically Signed by Slime Pinto MD on 1/16/2023 at 11:37 AM

## 2023-01-17 ENCOUNTER — PATIENT OUTREACH (OUTPATIENT)
Dept: OTHER | Age: 33
End: 2023-01-17

## 2023-01-17 NOTE — PROGRESS NOTES
Verified  and address for HIPAA security. Introduced [employer] for patient. Patient does not identify any Care Management needs at this time and declines services. Pt reported she has follow-up with surgeon 23. Pt reported 5/10 on pain scale with pain med. Denies s/s of infection. Foot is currently in a cast, reporting good circulation to toes. Patient denies C/P, SOB, cough, wheezing, fever, pain/swelling of legs or feet, N/V, diarrhea, difficulty urinating or constipation. Appetite/hydration good. No questions or concerns at this time. Contact info provided for future reference.

## 2023-03-21 ENCOUNTER — HOSPITAL ENCOUNTER (OUTPATIENT)
Dept: PHYSICAL THERAPY | Age: 33
Discharge: HOME | End: 2023-03-21
Payer: COMMERCIAL

## 2023-03-21 PROCEDURE — 97161 PT EVAL LOW COMPLEX 20 MIN: CPT | Performed by: PHYSICAL THERAPIST

## 2023-03-21 NOTE — THERAPY EVALUATION
[address] . [address] [place] [address], [address]  Phone: [phone]   Fax: [phone]    PT INITIAL EVALUATION NOTE - [facility] 2-15  Plan of Care/Statement of Necessity for Physical Therapy Services  -15    Patient Name: Maciel Cortez  Date:3/21/2023  : 1990  [x]  Patient  Verified  Provider#: [id number]  Payor: Patrick Ramos / Plan: Deniz Shanks / Product Type: HMO /    Referral source: Jorden Craig MD   In time:110   Out time:150   Total Treatment Time (min): 40 minutes. Total Timed Codes (min): 40 minutes. Visit #: 1      Start of Care: 3/21/2023      Onset Date: 2023, spring/deltoid/PTT repair, FDL transfer 2023     Treatment Area/Diagnosis: Pain in right foot [M79.671]    SUBJECTIVE  Pain Level (0-10 scale): 5- typical                 Best: 0-boot on even surfaces           Worst:  9-  any IV, DF. Description: sharp with movement,  dull ache most of the time. Any medication changes, allergies to medications, adverse drug reactions, diagnosis change, or new procedure performed?: [] No    [x] Yes (see summary sheet for update)    Subjective:    Chief complaint of  post op foot pain . No longer taking medication. Sleep is moderately disturbed. Wearing CAM boot but MD stated I can transition to an ASO + shoe beginning today. She reports she was too aggressive after the last surgery and wants to take it as slow as necessary for a good outcome. PLOF: . Mechanism of Injury: multiple sprains in the past.   Previous Treatment/Compliance: no rehab after current procedure. PMHx:  insomnia, mild anxiety. Surgical Hx: see medical records. 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 Living Situation: lives with supportive , 5 children ( 2 live at home)    Pt Goals: walk without pain. Return to work. Barriers: none  Motivation: excellent   Substance use: smoke  Cognition: A & O x 4        OBJECTIVE/EXAMINATION  Physical Findings   Ortho:   WBS: transition out of CAM boot to ASO. Posture:  foot non NWB, unremarkable. Observation:  incision dry and healing with exception of lower 1/3 where MD removed several stitches today. Gait and Functional Mobility:  not observed. Patient did not have brace or shoe today. Palpation: TTP along posterior tibialis tendon from distal 1/3 into the navicular and medial cuneiform. Swelling: figure 8 right:  57.0 cm, left: 52.0 cm   Specific joints: *normal values in ()  ANKLE                               AROM                     PROM                     MMT:   R L R L R L   Dorsiflexion (15)  10 20       Plantarflexion (50) 30 35       Inversion (35)  20 40       Eversion (25) 10 20       Additional comments: pain with DF / IV      Additional comments:  Strength not tested at this time due to surgical consideration. Sensations:  intact to light touch. With   [x] TE   [] TA   [] neuro   [] other: Patient Education: [x] Review HEP  , see [address] [place] [address] program , access code. Scott Cortes   [] Progressed/Changed HEP based on:   [] positioning   [] body mechanics   [] transfers   [] heat/ice application    [] other:      Other Objective/Functional Measures: 0  TUG: < 10 seconds with boot. Pain Level (0-10 scale) post treatment: 5    ASSESSMENT/Key Information/Changes in Function:   29year old s/p foot reconstruction as listed above. Will benefit from PT to ensure and uneventful recovery. Problem List: pain affecting function, decrease ROM, decrease strength, impaired gait/ balance, and decrease flexibility/ joint mobility    Short Term Goals: To be accomplished in 4 weeks:   1. Inpd with HEP:  5 exercises issued today. 2.  Passive = b/l .                3. Pain is consistently < 3/10. Long Term Goals:  To be accomplished in 8 weeks: 1. Gait WNL on even surfaces              2.  Unilateral stance on right :  10 seconds, steady. Patient Goal (s): walk on even surfaces with mild to no pain     Evaluation Complexity History LOW Complexity : Zero comorbidities / personal factors that will impact the outcome / POC; Examination LOW Complexity : 1-2 Standardized tests and measures addressing body structure, function, activity limitation and / or participation in recreation  ;Presentation LOW Complexity : Stable, uncomplicated  ;Clinical Decision Making Other outcome measures AM PAC  46. 22%  MEDIUM  Overall Complexity Rating: LOW      Patient / Family readiness to learn indicated by: asking questions, trying to perform skills, and interest  Persons(s) to be included in education: patient (P)  Barriers to Learning/Limitations: None  Patient Self Reported Health Status: good  Rehabilitation Potential: good  Patient/ Caregiver education and instruction: activity modification, brace/ splint application, and exercises      PLAN:  Treatment Plan may include any combination of the following: Therapeutic exercise, Neuromuscular reeducation, Manual therapy, Therapeutic activity, and Gait training  HEP Issued: see above . Frequency / Duration: Patient to be seen 2 times per week for 8 weeks. [x]  Plan of care has been reviewed with PTA    Certification Period: 3/21/2023  to  6/21/2022 April Marian, PT 3/21/2023     ________________________________________________________________________    I certify that the above Therapy Services are being furnished while the patient is under my care. I agree with the treatment plan and certify that this therapy is necessary.     Physician's Signature:____________________  Date:____________Time: _________            Radha Sesay MD no

## 2023-03-29 ENCOUNTER — HOSPITAL ENCOUNTER (OUTPATIENT)
Dept: PHYSICAL THERAPY | Age: 33
End: 2023-03-29
Payer: COMMERCIAL

## 2023-03-29 PROCEDURE — 97140 MANUAL THERAPY 1/> REGIONS: CPT | Performed by: PHYSICAL THERAPIST

## 2023-03-29 PROCEDURE — 97110 THERAPEUTIC EXERCISES: CPT | Performed by: PHYSICAL THERAPIST

## 2023-03-29 NOTE — PROGRESS NOTES
PT DAILY TREATMENT NOTE - [facility] 2-15    Patient Name: Oli Westbrook  Date:3/29/2023  : 1990  [x]  Patient  Verified  Payor: BLUE CROSS / Plan: Deysi Sepulveda / Product Type: HMO /    In time:930 AM   Out time:1020AM   Total Treatment Time (min): 41 MINUTES. Total Timed Codes (min): 50 minutes. Visit #:  2    Treatment Area: Pain in right foot [M79.671]    SUBJECTIVE  Pain Level (0-10 scale): 5  Any medication changes, allergies to medications, adverse drug reactions, diagnosis change, or new procedure performed?: [x] No    [] Yes (see summary sheet for update)  Subjective functional status/changes:   [] No changes reported  Doing HEP. Prefers to not do activities she can do at home while here in PT. Wear ankle brace with sneaker. Pain is a constant 5/10 while on her feet. OBJECTIVE    31 min Therapeutic Exercise:  [x] See flow sheet : includes instruction/education   Rationale: increase ROM, increase strength, improve coordination, and improve balance to improve the patients ability to walk without normal pattern without pain . 10 min Manual Therapy: STM/joint mobs: gentle ankle into toes. Rationale: decrease pain, increase ROM, and increase tissue extensibility to improve the patients ability to walk without pain           With   [x] TE   [] TA   [] neuro   [] other: Patient Education: [x] Review HEP  IV/PF + OTB, seated heel raises. [] Progressed/Changed HEP based on:   [] positioning   [] body mechanics   [] transfers   [] heat/ice application    [] other:      Other Objective/Functional Measures:      Pain Level (0-10 scale) post treatment: 7    ASSESSMENT/Changes in Function:   Mild increase in pain requiring rest breaks. Instructed patient to decrease intensity of program based on pain and swelling. Transition in/out of boot as needed. Also discuss return to work scheduled for 2023. Suggested she consider RTW May 1, 2023.   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 Patient will continue to benefit from skilled PT services to modify and progress therapeutic interventions, address functional mobility deficits, address ROM deficits, and address strength deficits to attain remaining goals. [x]  See Plan of Care  []  See progress note/recertification  []  See Discharge Summary         Progress towards goals / Updated goals:  Short Term Goals: To be accomplished in 4 weeks:              1.  Inpd with HEP:  5 exercises issued today. 2.  Passive = b/l .                3. Pain is consistently < 3/10. Long Term Goals: To be accomplished in 8 weeks:              1.  Gait WNL on even surfaces              2.  Unilateral stance on right :  10 seconds, steady.                    Patient Goal (s): walk on even surfaces with mild to no pain     PLAN  [x]  Upgrade activities as tolerated     [x]  Continue plan of care  []  Update interventions per flow sheet       []  Discharge due to:_  []  Other:_      April Derrek-Raquel, PT 3/29/2023

## 2023-03-31 ENCOUNTER — APPOINTMENT (OUTPATIENT)
Dept: PHYSICAL THERAPY | Age: 33
End: 2023-03-31
Payer: COMMERCIAL

## 2023-04-03 ENCOUNTER — APPOINTMENT (OUTPATIENT)
Dept: PHYSICAL THERAPY | Age: 33
End: 2023-04-03
Payer: COMMERCIAL

## 2023-04-07 ENCOUNTER — APPOINTMENT (OUTPATIENT)
Dept: PHYSICAL THERAPY | Age: 33
End: 2023-04-07
Payer: COMMERCIAL

## 2023-04-13 ENCOUNTER — HOSPITAL ENCOUNTER (OUTPATIENT)
Dept: PHYSICAL THERAPY | Age: 33
Discharge: HOME | End: 2023-04-13
Payer: COMMERCIAL

## 2023-04-13 PROCEDURE — 97110 THERAPEUTIC EXERCISES: CPT | Performed by: PHYSICAL THERAPIST

## 2023-04-13 PROCEDURE — 97140 MANUAL THERAPY 1/> REGIONS: CPT | Performed by: PHYSICAL THERAPIST

## 2023-04-18 ENCOUNTER — HOSPITAL ENCOUNTER (OUTPATIENT)
Dept: PHYSICAL THERAPY | Age: 33
Discharge: HOME | End: 2023-04-18
Payer: COMMERCIAL

## 2023-04-18 PROCEDURE — 97140 MANUAL THERAPY 1/> REGIONS: CPT | Performed by: PHYSICAL THERAPIST

## 2023-04-18 PROCEDURE — 97110 THERAPEUTIC EXERCISES: CPT | Performed by: PHYSICAL THERAPIST

## 2023-04-25 ENCOUNTER — HOSPITAL ENCOUNTER (OUTPATIENT)
Dept: PHYSICAL THERAPY | Age: 33
Discharge: HOME | End: 2023-04-25
Payer: COMMERCIAL

## 2023-04-25 PROCEDURE — 97035 APP MDLTY 1+ULTRASOUND EA 15: CPT | Performed by: PHYSICAL THERAPIST

## 2023-04-25 PROCEDURE — 97140 MANUAL THERAPY 1/> REGIONS: CPT | Performed by: PHYSICAL THERAPIST

## 2023-04-25 PROCEDURE — 97110 THERAPEUTIC EXERCISES: CPT | Performed by: PHYSICAL THERAPIST

## 2023-05-01 ENCOUNTER — HOSPITAL ENCOUNTER (OUTPATIENT)
Facility: HOSPITAL | Age: 33
Setting detail: RECURRING SERIES
End: 2023-05-01
Payer: COMMERCIAL

## 2023-05-02 ENCOUNTER — HOSPITAL ENCOUNTER (OUTPATIENT)
Dept: PHYSICAL THERAPY | Age: 33
Discharge: HOME | End: 2023-05-02
Payer: COMMERCIAL

## 2023-05-02 PROCEDURE — 9990 CHARGE CONVERSION

## 2023-05-02 PROCEDURE — 97110 THERAPEUTIC EXERCISES: CPT | Performed by: PHYSICAL THERAPIST

## 2023-05-02 PROCEDURE — 97035 APP MDLTY 1+ULTRASOUND EA 15: CPT

## 2023-05-02 PROCEDURE — 97035 APP MDLTY 1+ULTRASOUND EA 15: CPT | Performed by: PHYSICAL THERAPIST

## 2023-05-02 PROCEDURE — 97110 THERAPEUTIC EXERCISES: CPT

## 2023-05-02 PROCEDURE — 97140 MANUAL THERAPY 1/> REGIONS: CPT | Performed by: PHYSICAL THERAPIST

## 2023-05-02 PROCEDURE — 97140 MANUAL THERAPY 1/> REGIONS: CPT

## 2023-05-09 ENCOUNTER — APPOINTMENT (OUTPATIENT)
Dept: PHYSICAL THERAPY | Age: 33
End: 2023-05-09
Payer: COMMERCIAL

## 2023-05-09 ENCOUNTER — HOSPITAL ENCOUNTER (OUTPATIENT)
Facility: HOSPITAL | Age: 33
Setting detail: RECURRING SERIES
Discharge: HOME | End: 2023-05-12
Payer: COMMERCIAL

## 2023-05-09 PROCEDURE — 97035 APP MDLTY 1+ULTRASOUND EA 15: CPT | Performed by: PHYSICAL THERAPIST

## 2023-05-09 PROCEDURE — 97110 THERAPEUTIC EXERCISES: CPT | Performed by: PHYSICAL THERAPIST

## 2023-05-09 PROCEDURE — 97140 MANUAL THERAPY 1/> REGIONS: CPT | Performed by: PHYSICAL THERAPIST

## 2023-05-09 NOTE — PROGRESS NOTES
PHYSICAL THERAPY - DAILY TREATMENT NOTE (updated 3/23)      Date: 2023          Patient Name:  Jyoti Moe :  1990   Medical   Diagnosis:  Pain in right foot [M79.671] Treatment Diagnosis:  M79.671  RIGHT FOOT PAIN    Referral Source:  Margueritte Essex, MD Insurance:   Payor: Dany Benavidesley / Plan: Benigno Gloria / Product Type: *No Product type* /                     Patient  verified yes     Visit #   Current  / Total 7 30   Time   In / Out 845 AM  9:45 AM    Total Treatment Time 45 minutes   Total Timed Codes 38 minutes. SUBJECTIVE    Pain Level (0-10 scale): 7    Any medication changes, allergies to medications, adverse drug reactions, diagnosis change, or new procedure performed?: [x] No    [] Yes (see summary sheet for update)  Medications: Verified on Patient Summary List    Subjective functional status/changes:     Saw MD.  He reported that progress is as expected. Order PowerStep orthotics, continue PT. Issued Diclofanac to take 2x / day. It is really helping. OBJECTIVE      Therapeutic Procedures: Tx Min Billable or 1:1 Min (if diff from Tx Min) Procedure, Rationale, Specifics   45 minutes  22  [id number] Therapeutic Exercise (timed):  increase ROM, strength, coordination, balance, and proprioception to improve patient's ability to progress to PLOF and address remaining functional goals. (see flow sheet as applicable)     Details if applicable:      10 00123 Manual Therapy (timed):  decrease pain and decrease trigger points to improve patient's ability to progress to PLOF and address remaining functional goals. The manual therapy interventions were performed at a separate and distinct time from the therapeutic activities interventions .  (see flow sheet as applicable)     Details if applicable:                    45  32    Total Total         Modalities Rationale:     decrease inflammation and decrease pain to improve patient's ability to progress to PLOF and address

## 2023-05-17 ENCOUNTER — APPOINTMENT (OUTPATIENT)
Facility: HOSPITAL | Age: 33
End: 2023-05-17
Payer: COMMERCIAL

## 2023-05-22 ENCOUNTER — HOSPITAL ENCOUNTER (OUTPATIENT)
Facility: HOSPITAL | Age: 33
Setting detail: RECURRING SERIES
Discharge: HOME | End: 2023-05-25
Payer: COMMERCIAL

## 2023-05-22 PROCEDURE — 97140 MANUAL THERAPY 1/> REGIONS: CPT | Performed by: PHYSICAL THERAPIST

## 2023-05-22 PROCEDURE — 97110 THERAPEUTIC EXERCISES: CPT | Performed by: PHYSICAL THERAPIST

## 2023-05-22 PROCEDURE — 97035 APP MDLTY 1+ULTRASOUND EA 15: CPT | Performed by: PHYSICAL THERAPIST

## 2023-05-22 NOTE — PROGRESS NOTES
PHYSICAL THERAPY - DAILY TREATMENT NOTE (updated 3/23)      Date: 2023          Patient Name:  Madai Landa :  1990   Medical   Diagnosis:  Pain in right foot [M79.671] Treatment Diagnosis:  M79.671  RIGHT FOOT PAIN    Referral Source:  Nakita Lorenzo MD Insurance:   Payor: Rosalba Arriaga / Plan: Williams Cervantes / Product Type: *No Product type* /                     Patient  verified yes     Visit #   Current  / Total 8 30   Time   In / Out 845 AM  9:45 AM    Total Treatment Time 45 minutes   Total Timed Codes 41 minutes. SUBJECTIVE    Pain Level (0-10 scale): 3    Any medication changes, allergies to medications, adverse drug reactions, diagnosis change, or new procedure performed?: [x] No    [] Yes (see summary sheet for update)  Medications: Verified on Patient Summary List    Subjective functional status/changes:     Pain intensity has decreased. Continues to wear CAM boot while at work. OBJECTIVE      Therapeutic Procedures: Tx Min Billable or 1:1 Min (if diff from Tx Min) Procedure, Rationale, Specifics   25 25 [id number] Therapeutic Exercise (timed):  increase ROM, strength, coordination, balance, and proprioception to improve patient's ability to progress to PLOF and address remaining functional goals. (see flow sheet as applicable)     Details if applicable:      10 [id number] Manual Therapy (timed):  decrease pain and decrease trigger points to improve patient's ability to progress to PLOF and address remaining functional goals. The manual therapy interventions were performed at a separate and distinct time from the therapeutic activities interventions . (see flow sheet as applicable)     Details if applicable:                    45  32    Total Total         Modalities Rationale:     decrease inflammation and decrease pain to improve patient's ability to progress to PLOF and address remaining functional goals.                       6 min [x]  Ultrasound,

## 2023-05-23 ENCOUNTER — APPOINTMENT (OUTPATIENT)
Facility: HOSPITAL | Age: 33
End: 2023-05-23
Payer: COMMERCIAL

## 2024-03-19 ENCOUNTER — HOSPITAL ENCOUNTER (OUTPATIENT)
Facility: HOSPITAL | Age: 34
Discharge: HOME | End: 2024-03-22
Payer: COMMERCIAL

## 2024-03-19 PROCEDURE — 86803 HEPATITIS C AB TEST: CPT

## 2024-03-19 PROCEDURE — 36415 COLL VENOUS BLD VENIPUNCTURE: CPT

## 2024-03-19 PROCEDURE — 86706 HEP B SURFACE ANTIBODY: CPT

## 2024-03-19 PROCEDURE — 87340 HEPATITIS B SURFACE AG IA: CPT

## 2024-03-19 PROCEDURE — 87389 HIV-1 AG W/HIV-1&-2 AB AG IA: CPT

## 2024-03-29 ENCOUNTER — OFFICE VISIT (OUTPATIENT)
Facility: CLINIC | Age: 34
End: 2024-03-29
Payer: COMMERCIAL

## 2024-03-29 VITALS
OXYGEN SATURATION: 98 % | DIASTOLIC BLOOD PRESSURE: 78 MMHG | HEART RATE: 68 BPM | HEIGHT: 69 IN | WEIGHT: 184 LBS | RESPIRATION RATE: 18 BRPM | TEMPERATURE: 97.5 F | BODY MASS INDEX: 27.25 KG/M2 | SYSTOLIC BLOOD PRESSURE: 113 MMHG

## 2024-03-29 DIAGNOSIS — Z13.220 LIPID SCREENING: ICD-10-CM

## 2024-03-29 DIAGNOSIS — F41.1 GENERALIZED ANXIETY DISORDER: Primary | ICD-10-CM

## 2024-03-29 DIAGNOSIS — Z13.1 DIABETES MELLITUS SCREENING: ICD-10-CM

## 2024-03-29 DIAGNOSIS — M76.821 POSTERIOR TIBIAL TENDINITIS OF RIGHT LEG: ICD-10-CM

## 2024-03-29 DIAGNOSIS — F41.1 GENERALIZED ANXIETY DISORDER: ICD-10-CM

## 2024-03-29 PROBLEM — F41.0 PANIC DISORDER: Status: ACTIVE | Noted: 2022-07-26

## 2024-03-29 PROBLEM — R68.82 DIMINISHED LIBIDO: Status: ACTIVE | Noted: 2023-03-02

## 2024-03-29 PROBLEM — M72.2 PLANTAR FASCIITIS OF RIGHT FOOT: Status: ACTIVE | Noted: 2021-04-27

## 2024-03-29 PROCEDURE — 99204 OFFICE O/P NEW MOD 45 MIN: CPT | Performed by: STUDENT IN AN ORGANIZED HEALTH CARE EDUCATION/TRAINING PROGRAM

## 2024-03-29 RX ORDER — BUPROPION HYDROCHLORIDE 150 MG/1
150 TABLET ORAL DAILY
COMMUNITY
Start: 2023-08-09 | End: 2024-03-29 | Stop reason: ALTCHOICE

## 2024-03-29 RX ORDER — ESCITALOPRAM OXALATE 10 MG/1
10 TABLET ORAL DAILY
Qty: 30 TABLET | Refills: 1 | Status: SHIPPED | OUTPATIENT
Start: 2024-03-29

## 2024-03-29 SDOH — ECONOMIC STABILITY: FOOD INSECURITY: WITHIN THE PAST 12 MONTHS, YOU WORRIED THAT YOUR FOOD WOULD RUN OUT BEFORE YOU GOT MONEY TO BUY MORE.: NEVER TRUE

## 2024-03-29 SDOH — ECONOMIC STABILITY: HOUSING INSECURITY
IN THE LAST 12 MONTHS, WAS THERE A TIME WHEN YOU DID NOT HAVE A STEADY PLACE TO SLEEP OR SLEPT IN A SHELTER (INCLUDING NOW)?: NO

## 2024-03-29 SDOH — ECONOMIC STABILITY: FOOD INSECURITY: WITHIN THE PAST 12 MONTHS, THE FOOD YOU BOUGHT JUST DIDN'T LAST AND YOU DIDN'T HAVE MONEY TO GET MORE.: NEVER TRUE

## 2024-03-29 SDOH — ECONOMIC STABILITY: INCOME INSECURITY: HOW HARD IS IT FOR YOU TO PAY FOR THE VERY BASICS LIKE FOOD, HOUSING, MEDICAL CARE, AND HEATING?: NOT HARD AT ALL

## 2024-03-29 ASSESSMENT — PATIENT HEALTH QUESTIONNAIRE - PHQ9
SUM OF ALL RESPONSES TO PHQ QUESTIONS 1-9: 0
2. FEELING DOWN, DEPRESSED OR HOPELESS: NOT AT ALL
SUM OF ALL RESPONSES TO PHQ QUESTIONS 1-9: 0
1. LITTLE INTEREST OR PLEASURE IN DOING THINGS: NOT AT ALL
SUM OF ALL RESPONSES TO PHQ QUESTIONS 1-9: 0
SUM OF ALL RESPONSES TO PHQ QUESTIONS 1-9: 0
SUM OF ALL RESPONSES TO PHQ9 QUESTIONS 1 & 2: 0

## 2024-03-29 NOTE — PROGRESS NOTES
[facility]    Subjective       Chief Complaint   Patient presents with    [facility]     HPI:  Elsa Ziegler is a 33 y.o. female.    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)    Hard time falling asleep  Says mind not racing at night  Consistent sleeping schedule  Some screens before bed but minimized as much as possible, going to school also  No liquids up until bed time (not after dinner)  Just caffeine in the morning    [facility] (tele strip) showed normal QTc         No data to display              CHRONIC TENDINITIS, RIGHT  Removed excessive navicular bone (two tendon transfer)  Wearing a brace most of the time  Following with ortho  Gets injections intermittent    Has had some night sweats a few times a month    FH has hypothyroid in mom    Several kids at home  One kid passed away from leukemia in past  Works as an RN at [employer]  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    SPECIALISTS:  [facility]  [facility]  [facility] (graduating from them)    Health Maintenance Female:    Depression Screen:       3/29/2024     9:04 AM   PHQ Scores   PHQ2 Score 0   PHQ9 Score 0      Cervical Cancer Screen/PAP:  03/01/2023 NEGATIVE FOR INTRAEPITHELIAL LESION OR MALIGNANCY and HPV NEGATIVE    HCV Screen:   No results found for: \"HEPCAB\"     Smoking Status:   Tobacco Use      Smoking status: Never      Smokeless tobacco: Never    Exercise: going to gym, cardio 3x a week- walking; 2 times a week weights  Diet: try to do mostly healthy    Sexually Active: Yes  Using Contraception: No    Immunizations: utd    Past Medical History:   Diagnosis Date    Adjustment insomnia     Anxiety     Depression

## 2024-03-29 NOTE — PROGRESS NOTES
\"Have you been to the ER, urgent care clinic since your last visit?  Hospitalized since your last visit?\"    NO    “Have you seen or consulted any other health care providers outside of [facility] since your last visit?”    NO     “Have you had a pap smear?”    Yes,[facility] 3/1/23    Chief Complaint   Patient presents with    Establish Care   /78 (Site: Right Upper Arm, Position: Sitting, Cuff Size: Large Adult)   Pulse 68   Temp 97.5 °F (36.4 °C) (Temporal)   Resp 18   Ht 1.753 m (5' 9\")   Wt 83.5 kg (184 lb)   SpO2 98%   BMI 27.17 kg/m²

## 2024-05-05 LAB
BASOPHILS # BLD AUTO: 0 X10E3/UL (ref 0–0.2)
BASOPHILS NFR BLD AUTO: 0 %
EOSINOPHIL # BLD AUTO: 0.2 X10E3/UL (ref 0–0.4)
EOSINOPHIL NFR BLD AUTO: 3 %
ERYTHROCYTE [DISTWIDTH] IN BLOOD BY AUTOMATED COUNT: 12.8 % (ref 11.7–15.4)
HCT VFR BLD AUTO: 37.1 % (ref 34–46.6)
HGB BLD-MCNC: 12.2 G/DL (ref 11.1–15.9)
IMM GRANULOCYTES # BLD AUTO: 0 X10E3/UL (ref 0–0.1)
IMM GRANULOCYTES NFR BLD AUTO: 0 %
LYMPHOCYTES # BLD AUTO: 1.8 X10E3/UL (ref 0.7–3.1)
LYMPHOCYTES NFR BLD AUTO: 37 %
MCH RBC QN AUTO: 29 PG (ref 26.6–33)
MCHC RBC AUTO-ENTMCNC: 32.9 G/DL (ref 31.5–35.7)
MCV RBC AUTO: 88 FL (ref 79–97)
MONOCYTES # BLD AUTO: 0.7 X10E3/UL (ref 0.1–0.9)
MONOCYTES NFR BLD AUTO: 14 %
NEUTROPHILS # BLD AUTO: 2.3 X10E3/UL (ref 1.4–7)
NEUTROPHILS NFR BLD AUTO: 46 %
PLATELET # BLD AUTO: 249 X10E3/UL (ref 150–450)
RBC # BLD AUTO: 4.21 X10E6/UL (ref 3.77–5.28)
WBC # BLD AUTO: 5 X10E3/UL (ref 3.4–10.8)

## 2024-05-06 LAB
ALBUMIN SERPL-MCNC: 4.3 G/DL (ref 3.9–4.9)
ALBUMIN/GLOB SERPL: 2 {RATIO} (ref 1.2–2.2)
ALP SERPL-CCNC: 51 IU/L (ref 44–121)
ALT SERPL-CCNC: 17 IU/L (ref 0–32)
AST SERPL-CCNC: 24 IU/L (ref 0–40)
BILIRUB SERPL-MCNC: 0.6 MG/DL (ref 0–1.2)
BUN SERPL-MCNC: 18 MG/DL (ref 6–20)
BUN/CREAT SERPL: 22 (ref 9–23)
CALCIUM SERPL-MCNC: 9.3 MG/DL (ref 8.7–10.2)
CHLORIDE SERPL-SCNC: 102 MMOL/L (ref 96–106)
CHOLEST SERPL-MCNC: 168 MG/DL (ref 100–199)
CO2 SERPL-SCNC: 23 MMOL/L (ref 20–29)
CREAT SERPL-MCNC: 0.83 MG/DL (ref 0.57–1)
EGFRCR SERPLBLD CKD-EPI 2021: 97 ML/MIN/1.73
GLOBULIN SER CALC-MCNC: 2.1 G/DL (ref 1.5–4.5)
GLUCOSE SERPL-MCNC: 63 MG/DL (ref 70–99)
HDLC SERPL-MCNC: 74 MG/DL
LDLC SERPL CALC-MCNC: 80 MG/DL (ref 0–99)
POTASSIUM SERPL-SCNC: 4.6 MMOL/L (ref 3.5–5.2)
PROT SERPL-MCNC: 6.4 G/DL (ref 6–8.5)
SODIUM SERPL-SCNC: 139 MMOL/L (ref 134–144)
TRIGL SERPL-MCNC: 77 MG/DL (ref 0–149)
TSH SERPL DL<=0.005 MIU/L-ACNC: 1.21 UIU/ML (ref 0.45–4.5)
VLDLC SERPL CALC-MCNC: 14 MG/DL (ref 5–40)

## 2024-05-10 ENCOUNTER — OFFICE VISIT (OUTPATIENT)
Facility: CLINIC | Age: 34
End: 2024-05-10
Payer: COMMERCIAL

## 2024-05-10 VITALS
TEMPERATURE: 97.5 F | BODY MASS INDEX: 27.25 KG/M2 | RESPIRATION RATE: 18 BRPM | HEART RATE: 64 BPM | OXYGEN SATURATION: 100 % | HEIGHT: 69 IN | SYSTOLIC BLOOD PRESSURE: 110 MMHG | WEIGHT: 184 LBS | DIASTOLIC BLOOD PRESSURE: 65 MMHG

## 2024-05-10 DIAGNOSIS — F41.1 GENERALIZED ANXIETY DISORDER: ICD-10-CM

## 2024-05-10 DIAGNOSIS — J34.89 SINUS PRESSURE: ICD-10-CM

## 2024-05-10 DIAGNOSIS — F33.9 EPISODE OF RECURRENT MAJOR DEPRESSIVE DISORDER, UNSPECIFIED DEPRESSION EPISODE SEVERITY (HCC): Primary | ICD-10-CM

## 2024-05-10 PROCEDURE — 99214 OFFICE O/P EST MOD 30 MIN: CPT | Performed by: STUDENT IN AN ORGANIZED HEALTH CARE EDUCATION/TRAINING PROGRAM

## 2024-05-10 RX ORDER — ESCITALOPRAM OXALATE 5 MG/1
5 TABLET ORAL DAILY
Qty: 90 TABLET | Refills: 1 | Status: SHIPPED | OUTPATIENT
Start: 2024-05-10

## 2024-05-10 RX ORDER — BUPROPION HYDROCHLORIDE 150 MG/1
150 TABLET ORAL EVERY MORNING
Qty: 30 TABLET | Refills: 2 | Status: SHIPPED | OUTPATIENT
Start: 2024-05-10

## 2024-05-10 SDOH — ECONOMIC STABILITY: FOOD INSECURITY: WITHIN THE PAST 12 MONTHS, THE FOOD YOU BOUGHT JUST DIDN'T LAST AND YOU DIDN'T HAVE MONEY TO GET MORE.: NEVER TRUE

## 2024-05-10 SDOH — ECONOMIC STABILITY: FOOD INSECURITY: WITHIN THE PAST 12 MONTHS, YOU WORRIED THAT YOUR FOOD WOULD RUN OUT BEFORE YOU GOT MONEY TO BUY MORE.: NEVER TRUE

## 2024-05-10 SDOH — ECONOMIC STABILITY: INCOME INSECURITY: HOW HARD IS IT FOR YOU TO PAY FOR THE VERY BASICS LIKE FOOD, HOUSING, MEDICAL CARE, AND HEATING?: NOT HARD AT ALL

## 2024-05-10 NOTE — PROGRESS NOTES
\"Have you been to the ER, urgent care clinic since your last visit?  Hospitalized since your last visit?\"    NO    “Have you seen or consulted any other health care providers outside of [facility] since your last visit?”    NO    Chief Complaint   Patient presents with    Anxiety    Sinus Problem     /65 (Site: Right Upper Arm, Position: Sitting, Cuff Size: Large Adult)   Pulse 64   Temp 97.5 °F (36.4 °C) (Temporal)   Resp 18   Ht 1.753 m (5' 9\")   Wt 83.5 kg (184 lb)   SpO2 100%   BMI 27.17 kg/m²

## 2024-05-10 NOTE — PROGRESS NOTES
[facility]    Subjective       Chief Complaint   Patient presents with    Anxiety    Sinus Problem     HPI:  Elsa Ziegler is a 33 y.o. female.    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    LORY / INSOMNIA  Was seeing [facility] psych (asked if pcp could take over meds- lexapro and trazodone)  Takes 1-2 tablets a night of trazodone  Lexparo has been on 5 mg prior to establish with me     Says she started the lexapro 10 mg but started noticing hot flash type symptoms and more depressive symptoms  Says she went to [employer] and didn't want to do anything, low interest and pleasure  Libido also very low  Went back to lexapro to 5 mg for a couple of weeks now and says most of that seems to have subsided  Still having a little trouble with sweating/libido    Says it is just her having these symptoms as far as hot flashes and no one around her  Did have tsh checked  Not having warm liquids when this happens         No data to display              Sinus pressure  Sore throat  Swollen lymph node  Denies fevers/chills  Denies body aches  Mucus nose  No cough yet  Duration about three days  Has been taking zyrtec and mucinex    SPECIALISTS:  [facility]  [facility]    Health Maintenance Female:    Depression Screen:       3/29/2024     9:04 AM   PHQ Scores   PHQ2 Score 0   PHQ9 Score 0      Cervical Cancer Screen/PAP:  03/01/2023 NEGATIVE FOR INTRAEPITHELIAL LESION OR MALIGNANCY and HPV NEGATIVE    HCV Screen:   No results found for: \"HEPCAB\"     Smoking Status:   Tobacco Use      Smoking status: Never      Smokeless tobacco: Never    Exercise: going to gym, cardio 3x a week- walking; 2 times a week weights  Diet: try to do mostly healthy    Sexually Active: Yes  Using Contraception: No    Immunizations: utd    Past Medical History:   Diagnosis Date    Adjustment insomnia     Anxiety     Depression     Tendonitis     right     Current Outpatient Medications   Medication Sig Dispense Refill

## 2024-07-16 ENCOUNTER — OFFICE VISIT (OUTPATIENT)
Facility: CLINIC | Age: 34
End: 2024-07-16
Payer: COMMERCIAL

## 2024-07-16 VITALS
RESPIRATION RATE: 18 BRPM | HEART RATE: 97 BPM | WEIGHT: 184 LBS | BODY MASS INDEX: 27.25 KG/M2 | SYSTOLIC BLOOD PRESSURE: 131 MMHG | TEMPERATURE: 101.3 F | DIASTOLIC BLOOD PRESSURE: 86 MMHG | HEIGHT: 69 IN

## 2024-07-16 DIAGNOSIS — R50.9 FEVER, UNSPECIFIED FEVER CAUSE: Primary | ICD-10-CM

## 2024-07-16 DIAGNOSIS — J06.9 VIRAL URI: ICD-10-CM

## 2024-07-16 LAB
EXP DATE SOLUTION: NORMAL
EXP DATE SWAB: NORMAL
EXPIRATION DATE: NORMAL
LOT NUMBER POC: NORMAL
LOT NUMBER SOLUTION: NORMAL
LOT NUMBER SWAB: NORMAL
QUICKVUE INFLUENZA TEST: NEGATIVE
SARS-COV-2 RNA, POC: NEGATIVE
VALID INTERNAL CONTROL, POC: NORMAL

## 2024-07-16 PROCEDURE — 99214 OFFICE O/P EST MOD 30 MIN: CPT | Performed by: STUDENT IN AN ORGANIZED HEALTH CARE EDUCATION/TRAINING PROGRAM

## 2024-07-16 PROCEDURE — 87804 INFLUENZA ASSAY W/OPTIC: CPT | Performed by: STUDENT IN AN ORGANIZED HEALTH CARE EDUCATION/TRAINING PROGRAM

## 2024-07-16 PROCEDURE — 87635 SARS-COV-2 COVID-19 AMP PRB: CPT | Performed by: STUDENT IN AN ORGANIZED HEALTH CARE EDUCATION/TRAINING PROGRAM

## 2024-07-16 SDOH — ECONOMIC STABILITY: FOOD INSECURITY: WITHIN THE PAST 12 MONTHS, YOU WORRIED THAT YOUR FOOD WOULD RUN OUT BEFORE YOU GOT MONEY TO BUY MORE.: NEVER TRUE

## 2024-07-16 SDOH — ECONOMIC STABILITY: FOOD INSECURITY: WITHIN THE PAST 12 MONTHS, THE FOOD YOU BOUGHT JUST DIDN'T LAST AND YOU DIDN'T HAVE MONEY TO GET MORE.: NEVER TRUE

## 2024-07-16 SDOH — ECONOMIC STABILITY: INCOME INSECURITY: HOW HARD IS IT FOR YOU TO PAY FOR THE VERY BASICS LIKE FOOD, HOUSING, MEDICAL CARE, AND HEATING?: NOT HARD AT ALL

## 2024-07-16 NOTE — PROGRESS NOTES
\"Have you been to the ER, urgent care clinic since your last visit?  Hospitalized since your last visit?\"    Yes, [facility]    “Have you seen or consulted any other health care providers outside of [facility] since your last visit?”    NO      Chief Complaint   Patient presents with    Fever    Cough    Headache    Sweats    Nausea & Vomiting    Generalized Body Aches     /86 (Site: Right Upper Arm, Position: Sitting, Cuff Size: Large Adult)   Pulse 97   Temp (!) 101.3 °F (38.5 °C) (Temporal)   Resp 18   Ht 1.753 m (5' 9\")   Wt 83.5 kg (184 lb)   BMI 27.17 kg/m²

## 2024-07-16 NOTE — PROGRESS NOTES
[facility]  Subjective       Chief Complaint   Patient presents with    Fever    Cough    Headache    Sweats    Nausea & Vomiting    Generalized Body Aches     HPI:  Elsa Ziegler is a 33 y.o. female.    Fever, cough, headache, sweats, n/v, generalized body aches  Symptom duration: first started Tuesday, worsened Sunday  Sick Contacts:   [facility]  Medications:  Ibuprofen, dayquil trying to push water    Has been out yesterday and today  Supposed to go to work Wednesday/thurday    Denies short of breath  Hurts to take a deep breath    No congestion  Doing hot steam/showers    Took ibuprofen 600 mg but none this morning    Past Medical History:   Diagnosis Date    Adjustment insomnia     Anxiety     Depression     Tendonitis     right     Current Outpatient Medications   Medication Sig Dispense Refill    buPROPion (WELLBUTRIN XL) 300 MG extended release tablet Take 1 tablet by mouth every morning 30 tablet 1    escitalopram (LEXAPRO) 5 MG tablet Take 1 tablet by mouth daily 90 tablet 1    traZODone (DESYREL) 50 MG tablet Take 1 tablet by mouth nightly       No current facility-administered medications for this visit.     Allergies   Allergen Reactions    Penicillins Hives     Review of Systems  See HPI    Objective     Vitals:    07/16/24 1129   BP: 131/86   Site: Right Upper Arm   Position: Sitting   Cuff Size: Large Adult   Pulse: 97   Resp: 18   Temp: (!) 101.3 °F (38.5 °C)   TempSrc: Temporal   Weight: 83.5 kg (184 lb)   Height: 1.753 m (5' 9\")       Physical Exam  Vitals reviewed.   Constitutional:       Appearance: Normal appearance.   HENT:      Head: Normocephalic and atraumatic.      Right Ear: External ear normal.      Left Ear: External ear normal.      Nose: Nose normal.      Mouth/Throat:      Mouth: Mucous membranes are moist.      Pharynx: No oropharyngeal exudate or posterior oropharyngeal erythema.   Eyes:      Conjunctiva/sclera: Conjunctivae

## 2024-09-06 DIAGNOSIS — F33.9 EPISODE OF RECURRENT MAJOR DEPRESSIVE DISORDER, UNSPECIFIED DEPRESSION EPISODE SEVERITY (HCC): ICD-10-CM

## 2024-09-06 RX ORDER — BUPROPION HYDROCHLORIDE 300 MG/1
300 TABLET ORAL EVERY MORNING
Qty: 30 TABLET | Refills: 1 | Status: SHIPPED | OUTPATIENT
Start: 2024-09-06

## 2024-09-06 NOTE — TELEPHONE ENCOUNTER
Method of communication:  [x]Fax []Phone call []In person   []Other:    Who is making request: [facility]  What medication/s (include strength and dosing):    Bupropion XL 300mg    This is for a:   [x]Refill    []New medication request  []Follow up on prior request    Pharmacy: [facility]  Best contact for patient: [phone]    Additional notes:

## 2024-09-06 NOTE — TELEPHONE ENCOUNTER
Requested Prescriptions     Pending Prescriptions Disp Refills    buPROPion (WELLBUTRIN XL) 300 MG extended release tablet 30 tablet 1     Sig: Take 1 tablet by mouth every morning

## 2024-11-11 ENCOUNTER — PATIENT MESSAGE (OUTPATIENT)
Facility: CLINIC | Age: 34
End: 2024-11-11

## 2024-11-11 DIAGNOSIS — Z11.1 TUBERCULOSIS SCREENING: Primary | ICD-10-CM

## 2024-11-11 NOTE — TELEPHONE ENCOUNTER
Patient is requesting an order QFT Gold to be sent to [facility] , no upcoming an appt. Was last seen 7/16/24

## 2024-11-12 DIAGNOSIS — F33.9 EPISODE OF RECURRENT MAJOR DEPRESSIVE DISORDER, UNSPECIFIED DEPRESSION EPISODE SEVERITY (HCC): ICD-10-CM

## 2024-11-12 RX ORDER — BUPROPION HYDROCHLORIDE 300 MG/1
300 TABLET ORAL EVERY MORNING
Qty: 30 TABLET | Refills: 1 | Status: SHIPPED | OUTPATIENT
Start: 2024-11-12

## 2024-11-12 NOTE — TELEPHONE ENCOUNTER
Requested Prescriptions     Pending Prescriptions Disp Refills    buPROPion (WELLBUTRIN XL) 300 MG extended release tablet [Pharmacy Med Name: BUPROPION XL 300MG TABLETS] 30 tablet 1     Sig: TAKE 1 TABLET BY MOUTH EVERY MORNING     Date of last OV: 07/16/2024  Future OV visit scheduled:  [] Yes -> Date:   [x] No    Last Refill: [] N/A  Date: 09/06/2024  Qty: 30  # of refills: 1    Med pending for provider review:  [x] Yes  [] No (provide reason why):     Requested Pharmacy updated in ENC:  [] Yes    Applicable labs (provide date of completion):  [] Diabetic med- A1c:  [] Cholesterol med- Lipids:  [] BP med- CMP or BMP:   [] Thyroid med- TSH:  [] Gout med- Uric acid:  [] Prostate med- PSA:  [] Other (provide what type of med and lab):    Additional notes:

## 2024-11-25 ENCOUNTER — PATIENT MESSAGE (OUTPATIENT)
Facility: CLINIC | Age: 34
End: 2024-11-25

## 2024-11-25 DIAGNOSIS — F41.1 GENERALIZED ANXIETY DISORDER: Primary | ICD-10-CM

## 2024-11-26 RX ORDER — HYDROXYZINE HYDROCHLORIDE 25 MG/1
25 TABLET, FILM COATED ORAL EVERY 8 HOURS PRN
Qty: 30 TABLET | Refills: 0 | Status: SHIPPED | OUTPATIENT
Start: 2024-11-26 | End: 2024-12-06

## 2024-12-03 LAB
GAMMA INTERFERON BACKGROUND BLD IA-ACNC: 0.05 IU/ML
M TB IFN-G BLD-IMP: NEGATIVE
M TB IFN-G CD4+ BCKGRND COR BLD-ACNC: 0.07 IU/ML
M TB IFN-G CD4+CD8+ BCKGRND COR BLD-ACNC: 0.07 IU/ML
MITOGEN IGNF BCKGRD COR BLD-ACNC: >10 IU/ML
QUANTIFERON, INCUBATION: NORMAL
SERVICE CMNT-IMP: NORMAL

## 2024-12-05 ENCOUNTER — OFFICE VISIT (OUTPATIENT)
Facility: CLINIC | Age: 34
End: 2024-12-05
Payer: COMMERCIAL

## 2024-12-05 VITALS
WEIGHT: 192 LBS | RESPIRATION RATE: 16 BRPM | OXYGEN SATURATION: 100 % | HEART RATE: 74 BPM | DIASTOLIC BLOOD PRESSURE: 64 MMHG | HEIGHT: 69 IN | SYSTOLIC BLOOD PRESSURE: 120 MMHG | BODY MASS INDEX: 28.44 KG/M2 | TEMPERATURE: 98.2 F

## 2024-12-05 DIAGNOSIS — F41.1 GENERALIZED ANXIETY DISORDER: Primary | ICD-10-CM

## 2024-12-05 DIAGNOSIS — F33.9 EPISODE OF RECURRENT MAJOR DEPRESSIVE DISORDER, UNSPECIFIED DEPRESSION EPISODE SEVERITY (HCC): ICD-10-CM

## 2024-12-05 PROCEDURE — 99214 OFFICE O/P EST MOD 30 MIN: CPT | Performed by: STUDENT IN AN ORGANIZED HEALTH CARE EDUCATION/TRAINING PROGRAM

## 2024-12-05 RX ORDER — TRAZODONE HYDROCHLORIDE 50 MG/1
50 TABLET, FILM COATED ORAL NIGHTLY
Qty: 90 TABLET | Refills: 1 | Status: SHIPPED | OUTPATIENT
Start: 2024-12-05

## 2024-12-05 RX ORDER — ESCITALOPRAM OXALATE 5 MG/1
5 TABLET ORAL DAILY
Qty: 90 TABLET | Refills: 1 | Status: SHIPPED | OUTPATIENT
Start: 2024-12-05

## 2024-12-05 RX ORDER — ALPRAZOLAM 0.25 MG/1
0.25 TABLET ORAL DAILY PRN
Qty: 10 TABLET | Refills: 0 | Status: SHIPPED | OUTPATIENT
Start: 2024-12-05 | End: 2024-12-15

## 2024-12-05 ASSESSMENT — ANXIETY QUESTIONNAIRES
IF YOU CHECKED OFF ANY PROBLEMS ON THIS QUESTIONNAIRE, HOW DIFFICULT HAVE THESE PROBLEMS MADE IT FOR YOU TO DO YOUR WORK, TAKE CARE OF THINGS AT HOME, OR GET ALONG WITH OTHER PEOPLE: VERY DIFFICULT
3. WORRYING TOO MUCH ABOUT DIFFERENT THINGS: MORE THAN HALF THE DAYS
6. BECOMING EASILY ANNOYED OR IRRITABLE: NEARLY EVERY DAY
2. NOT BEING ABLE TO STOP OR CONTROL WORRYING: MORE THAN HALF THE DAYS
GAD7 TOTAL SCORE: 13
5. BEING SO RESTLESS THAT IT IS HARD TO SIT STILL: SEVERAL DAYS
1. FEELING NERVOUS, ANXIOUS, OR ON EDGE: NEARLY EVERY DAY
7. FEELING AFRAID AS IF SOMETHING AWFUL MIGHT HAPPEN: SEVERAL DAYS
4. TROUBLE RELAXING: SEVERAL DAYS

## 2024-12-05 ASSESSMENT — PATIENT HEALTH QUESTIONNAIRE - PHQ9
SUM OF ALL RESPONSES TO PHQ QUESTIONS 1-9: 2
1. LITTLE INTEREST OR PLEASURE IN DOING THINGS: SEVERAL DAYS
SUM OF ALL RESPONSES TO PHQ QUESTIONS 1-9: 2
2. FEELING DOWN, DEPRESSED OR HOPELESS: SEVERAL DAYS
SUM OF ALL RESPONSES TO PHQ9 QUESTIONS 1 & 2: 2

## 2024-12-05 NOTE — PROGRESS NOTES
[facility]  Subjective  Chief Complaint   Patient presents with    Anxiety     HPI:  Elsa Ziegler  : 1990    PCP: Silvia Crawford DO    History of Present Illness  The patient is a 34-year-old female here to follow up on anxiety and depression.    She reports experiencing severe mood swings, particularly 7 to 10 days before her menstrual cycle, which are causing difficulties at work and home. She finds herself becoming easily irritated with others and stressed over minor issues. 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 She has noticed a decline in her organizational skills and memory over the past year, which she finds frustrating.    She has also been experiencing palpitations lasting a few hours, which she describes as PVCs. These episodes occur sporadically and are not associated with anxiety or other symptoms. During these episodes, she reports a sensation of throat tightness that resolves spontaneously after about 10 minutes.    She has gained weight, which she attributes to stress eating. She experiences nausea and pain, primarily in the mornings. Her sleep is managed with trazodone, which she takes nightly.     She is also on Wellbutrin 300 mg. She has discontinued Lexapro for the past 4 to 5 months.     She tried hydroxyzine, but it did not alleviate her irritation and anxiety, although it did help her sleep. She has stopped using marijuana. She is requesting a new prescription for trazodone.    She has an appointment with GI today. She did H. pylori testing and all the labs were negative. She was told that it is mostly related to diet.          Objective  Vitals:    24 1619   BP: 120/64   Site: Left Upper Arm   Position: Sitting   Cuff Size: Large Adult   Pulse: 74   Resp: 16   Temp: 98.2 °F (36.8 °C)   TempSrc: Temporal   SpO2: 100%   Weight: 87.1 kg (192 lb)   Height: 1.753 m (5'

## 2024-12-05 NOTE — PROGRESS NOTES
\"Have you been to the ER, urgent care clinic since your last visit?  Hospitalized since your last visit?\"    NO    “Have you seen or consulted any other health care providers outside our system since your last visit?”    NO           Chief Complaint   Patient presents with    Anxiety     /64 (Site: Left Upper Arm, Position: Sitting, Cuff Size: Large Adult)   Pulse 74   Temp 98.2 °F (36.8 °C) (Temporal)   Resp 16   Ht 1.753 m (5' 9\")   Wt 87.1 kg (192 lb)   SpO2 100%   BMI 28.35 kg/m²

## 2024-12-07 LAB
AMPHETAMINES UR QL SCN: NEGATIVE NG/ML
BARBITURATES UR QL SCN: NEGATIVE NG/ML
BENZODIAZ UR QL SCN: NEGATIVE NG/ML
BUPRENORPHINE UR QL: NEGATIVE NG/ML
BZE UR QL SCN: NEGATIVE NG/ML
CANNABINOIDS UR QL SCN: NEGATIVE NG/ML
CREAT UR-MCNC: 70.6 MG/DL (ref 20–300)
LABORATORY COMMENT REPORT: NORMAL
METHADONE UR QL SCN: NEGATIVE NG/ML
OPIATES UR QL SCN: NEGATIVE NG/ML
OXYCODONE+OXYMORPHONE UR QL SCN: NEGATIVE NG/ML
PCP UR QL: NEGATIVE NG/ML
PH UR: 6.8 [PH] (ref 4.5–8.9)
PROPOXYPH UR QL SCN: NEGATIVE NG/ML

## 2024-12-30 DIAGNOSIS — F33.9 EPISODE OF RECURRENT MAJOR DEPRESSIVE DISORDER, UNSPECIFIED DEPRESSION EPISODE SEVERITY (HCC): ICD-10-CM

## 2024-12-30 NOTE — TELEPHONE ENCOUNTER
Requested Prescriptions     Pending Prescriptions Disp Refills    buPROPion (WELLBUTRIN XL) 300 MG extended release tablet [Pharmacy Med Name: BUPROPION XL 300MG TABLETS] 90 tablet      Sig: TAKE 1 TABLET BY MOUTH EVERY MORNING     Date of last OV:12/5/24  Future OV visit scheduled:  [x] Yes -> Date: 2/4/25  [] No    Last Refill: [] N/A  Date:11/12/24  Qty:30  # of refills:1    Med pending for provider review:  [x] Yes  [] No (provide reason why):     Requested Pharmacy updated in ENC:  [x] Yes    Applicable labs (provide date of completion):  [x] Diabetic med- A1c:  [x] Cholesterol med- Lipids:  [x] BP med- CMP or BMP:   [x] Thyroid med- TSH:  [] Gout med- Uric acid:  [] Prostate med- PSA:  [] Other (provide what type of med and lab):  5/4/24  Additional notes:    
regular

## 2024-12-31 RX ORDER — BUPROPION HYDROCHLORIDE 300 MG/1
300 TABLET ORAL EVERY MORNING
Qty: 90 TABLET | Refills: 1 | Status: SHIPPED | OUTPATIENT
Start: 2024-12-31

## 2025-01-10 ENCOUNTER — PATIENT MESSAGE (OUTPATIENT)
Facility: CLINIC | Age: 35
End: 2025-01-10

## 2025-01-10 DIAGNOSIS — Z01.84 IMMUNITY STATUS TESTING: Primary | ICD-10-CM

## 2025-02-05 LAB — HBV SURFACE AB SER-ACNC: 13 MIU/ML

## 2025-03-17 DIAGNOSIS — F41.1 GENERALIZED ANXIETY DISORDER: ICD-10-CM

## 2025-03-17 DIAGNOSIS — F33.9 EPISODE OF RECURRENT MAJOR DEPRESSIVE DISORDER, UNSPECIFIED DEPRESSION EPISODE SEVERITY: ICD-10-CM

## 2025-03-17 RX ORDER — ESCITALOPRAM OXALATE 5 MG/1
5 TABLET ORAL DAILY
Qty: 90 TABLET | Refills: 1 | Status: SHIPPED | OUTPATIENT
Start: 2025-03-17

## 2025-03-17 NOTE — TELEPHONE ENCOUNTER
Requested Prescriptions     Pending Prescriptions Disp Refills    escitalopram (LEXAPRO) 5 MG tablet [Pharmacy Med Name: ESCITALOPRAM 5MG TABLETS] 90 tablet 1     Sig: TAKE 1 TABLET BY MOUTH DAILY     Date of last OV: 12.05.2024  Future OV visit scheduled:  [x] Yes -> Date: 04.15.2025  [] No    Last Refill: [] N/A  Date: 12.05.2024  Qty: 90  # of refills: 1    Med pending for provider review:  [x] Yes  [] No (provide reason why):     Requested Pharmacy updated in ENC:  [] Yes    Applicable labs (provide date of completion):  [] Diabetic med- A1c:  [] Cholesterol med- Lipids:  [] BP med- CMP or BMP:   [] Thyroid med- TSH:  [] Gout med- Uric acid:  [] Prostate med- PSA:  [] Other (provide what type of med and lab):    Additional notes:

## 2025-04-15 ENCOUNTER — OFFICE VISIT (OUTPATIENT)
Facility: CLINIC | Age: 35
End: 2025-04-15
Payer: COMMERCIAL

## 2025-04-15 VITALS
OXYGEN SATURATION: 97 % | TEMPERATURE: 98 F | HEART RATE: 68 BPM | RESPIRATION RATE: 17 BRPM | DIASTOLIC BLOOD PRESSURE: 71 MMHG | BODY MASS INDEX: 28.73 KG/M2 | SYSTOLIC BLOOD PRESSURE: 127 MMHG | WEIGHT: 194 LBS | HEIGHT: 69 IN

## 2025-04-15 DIAGNOSIS — Z00.01 ENCOUNTER FOR WELL ADULT EXAM WITH ABNORMAL FINDINGS: Primary | ICD-10-CM

## 2025-04-15 DIAGNOSIS — F41.1 GENERALIZED ANXIETY DISORDER: ICD-10-CM

## 2025-04-15 DIAGNOSIS — R41.840 IMPAIRED CONCENTRATION: ICD-10-CM

## 2025-04-15 PROCEDURE — 99213 OFFICE O/P EST LOW 20 MIN: CPT | Performed by: STUDENT IN AN ORGANIZED HEALTH CARE EDUCATION/TRAINING PROGRAM

## 2025-04-15 PROCEDURE — 99395 PREV VISIT EST AGE 18-39: CPT | Performed by: STUDENT IN AN ORGANIZED HEALTH CARE EDUCATION/TRAINING PROGRAM

## 2025-04-15 SDOH — ECONOMIC STABILITY: FOOD INSECURITY: WITHIN THE PAST 12 MONTHS, YOU WORRIED THAT YOUR FOOD WOULD RUN OUT BEFORE YOU GOT MONEY TO BUY MORE.: NEVER TRUE

## 2025-04-15 SDOH — ECONOMIC STABILITY: FOOD INSECURITY: WITHIN THE PAST 12 MONTHS, THE FOOD YOU BOUGHT JUST DIDN'T LAST AND YOU DIDN'T HAVE MONEY TO GET MORE.: NEVER TRUE

## 2025-04-15 ASSESSMENT — PATIENT HEALTH QUESTIONNAIRE - PHQ9
8. MOVING OR SPEAKING SO SLOWLY THAT OTHER PEOPLE COULD HAVE NOTICED. OR THE OPPOSITE, BEING SO FIGETY OR RESTLESS THAT YOU HAVE BEEN MOVING AROUND A LOT MORE THAN USUAL: NOT AT ALL
7. TROUBLE CONCENTRATING ON THINGS, SUCH AS READING THE NEWSPAPER OR WATCHING TELEVISION: NOT AT ALL
2. FEELING DOWN, DEPRESSED OR HOPELESS: NOT AT ALL
SUM OF ALL RESPONSES TO PHQ QUESTIONS 1-9: 0
5. POOR APPETITE OR OVEREATING: NOT AT ALL
3. TROUBLE FALLING OR STAYING ASLEEP: NOT AT ALL
9. THOUGHTS THAT YOU WOULD BE BETTER OFF DEAD, OR OF HURTING YOURSELF: NOT AT ALL
SUM OF ALL RESPONSES TO PHQ QUESTIONS 1-9: 0
6. FEELING BAD ABOUT YOURSELF - OR THAT YOU ARE A FAILURE OR HAVE LET YOURSELF OR YOUR FAMILY DOWN: NOT AT ALL
4. FEELING TIRED OR HAVING LITTLE ENERGY: NOT AT ALL
SUM OF ALL RESPONSES TO PHQ QUESTIONS 1-9: 0
10. IF YOU CHECKED OFF ANY PROBLEMS, HOW DIFFICULT HAVE THESE PROBLEMS MADE IT FOR YOU TO DO YOUR WORK, TAKE CARE OF THINGS AT HOME, OR GET ALONG WITH OTHER PEOPLE: NOT DIFFICULT AT ALL
1. LITTLE INTEREST OR PLEASURE IN DOING THINGS: NOT AT ALL
SUM OF ALL RESPONSES TO PHQ QUESTIONS 1-9: 0

## 2025-04-15 NOTE — PROGRESS NOTES
\"Have you been to the ER, urgent care clinic since your last visit?  Hospitalized since your last visit?\"    NO    “Have you seen or consulted any other health care providers outside our system since your last visit?”    NO    Chief Complaint   Patient presents with    Annual Exam     /71 (BP Site: Right Upper Arm, Patient Position: Sitting, BP Cuff Size: Large Adult)   Pulse 68   Temp 98 °F (36.7 °C) (Temporal)   Resp 17   Ht 1.753 m (5' 9\")   Wt 88 kg (194 lb)   SpO2 97%   BMI 28.65 kg/m²

## 2025-04-15 NOTE — PROGRESS NOTES
Well Adult Note  Name: Elsa Ziegler Today’s Date: 4/15/2025   MRN: [id number] Sex: Female   Age: 34 y.o. Ethnicity:  /    : 1990 Race: White (non-)      Elsa Ziegler is here for a well adult exam and acute concerns of concentration concerns.          Assessment & Plan  1. Concentration issues.  - Reports ongoing concentration issues affecting schoolwork and job, problematic for several years.  - Family history of ADD/ADHD noted.  - Neuropsychology evaluation recommended for comprehensive assessment.  - Referral to neuropsychology will be initiated.    2. Anxiety.  - Anxiety currently well-managed with prescription medication.  - Improvement in irritability and mood swings reported.  - Advised to continue current treatment plan.    Health Maintenance.  - Pap smear not due until , up to date on all vaccines.  - Recent labs, including cholesterol, sugar levels, thyroid, and CBC, within normal limits 1 year ago so we discussed pros/cons of repeating  - Advised to reduce portion sizes and ensure adequate protein intake (1.2 g/kg/day).  - Encouraged to continue drinking plenty of water.  - Follow-up screening for cholesterol due to BMI suggested in 2 to 3 years.    Encounter for well adult exam with abnormal findings  Impaired concentration  -     [facility] - Monse Araujo PsyD, NeuropsychologyRoderick ([address])  Generalized anxiety disorder  Results         Return in about 1 year (around 4/15/2026).     Subjective   History of Present Illness  The patient is a 35-year-old female who presents today for her annual wellness visit. She has a history of anxiety for which she is on prescription medication and would like to discuss some concentration issues.    An improvement in irritability and mood swings is reported, attributed to the efficacy of her current medication regimen for anxiety and depression. Anxiety and depression are well-managed at present.    However, difficulty with

## 2025-04-16 DIAGNOSIS — F33.9 EPISODE OF RECURRENT MAJOR DEPRESSIVE DISORDER, UNSPECIFIED DEPRESSION EPISODE SEVERITY: ICD-10-CM

## 2025-04-16 RX ORDER — BUPROPION HYDROCHLORIDE 300 MG/1
300 TABLET ORAL EVERY MORNING
Qty: 90 TABLET | Refills: 1 | Status: SHIPPED | OUTPATIENT
Start: 2025-04-16

## 2025-04-16 RX ORDER — TRAZODONE HYDROCHLORIDE 50 MG/1
50 TABLET ORAL NIGHTLY
Qty: 90 TABLET | Refills: 1 | Status: SHIPPED | OUTPATIENT
Start: 2025-04-16

## 2025-04-16 NOTE — TELEPHONE ENCOUNTER
Requested Prescriptions     Pending Prescriptions Disp Refills    traZODone (DESYREL) 50 MG tablet [Pharmacy Med Name: TRAZODONE 50MG TABLETS] 90 tablet 1     Sig: TAKE 1 TABLET BY MOUTH EVERY NIGHT    buPROPion (WELLBUTRIN XL) 300 MG extended release tablet [Pharmacy Med Name: BUPROPION XL 300MG TABLETS] 90 tablet 1     Sig: TAKE 1 TABLET BY MOUTH EVERY MORNING     Date of last OV: 04.15.2025  Future OV visit scheduled:  [x] Yes -> Date: 04.24.2026  [] No    Last Refill: [] N/A - trazdone  Date:   12.05.2024  Qty: 90  # of refills: 1    Last Refill: [] N/A - bupropion  Date:  12.31.2024  Qty: 90  # of refills: 1    Med pending for provider review:  [x] Yes  [] No (provide reason why):     Requested Pharmacy updated in ENC:  [] Yes    Applicable labs (provide date of completion):  [] Diabetic med- A1c:  [] Cholesterol med- Lipids:  [] BP med- CMP or BMP:   [] Thyroid med- TSH:  [] Gout med- Uric acid:  [] Prostate med- PSA:  [] Other (provide what type of med and lab):    Additional notes:

## 2025-06-23 ENCOUNTER — TELEPHONE (OUTPATIENT)
Age: 35
End: 2025-06-23

## (undated) DEVICE — DRAPE,U/ SHT,SPLIT,PLAS,STERIL: Brand: MEDLINE

## (undated) DEVICE — SOLUTION IRRIG 1000ML 0.9% SOD CHL USP POUR PLAS BTL

## (undated) DEVICE — GLOVE SURG SZ 8 CRM LTX FREE POLYISOPRENE POLYMER BEAD ANTI

## (undated) DEVICE — CUFF REPROC TRNQT DPSB W/PLC BROWN 34IN

## (undated) DEVICE — INTENDED FOR TISSUE SEPARATION, AND OTHER PROCEDURES THAT REQUIRE A SHARP SURGICAL BLADE TO PUNCTURE OR CUT.: Brand: BARD-PARKER ® CARBON RIB-BACK BLADES

## (undated) DEVICE — TAPE CAST 4 YDX3 IN FIBERGLASS WHT DELT LT CNFRM

## (undated) DEVICE — MAJOR ORTHO PROCEDURE PACK: Brand: MEDLINE INDUSTRIES, INC.

## (undated) DEVICE — SUTURE FIBERWIRE SZ 2 L38IN NONABSORBABLE BLU L36.6MM 1/2 AR7202

## (undated) DEVICE — SOUTHSIDE TURNOVER: Brand: MEDLINE INDUSTRIES, INC.

## (undated) DEVICE — COVER LT HNDL BLU PLAS

## (undated) DEVICE — PADDING CAST W4INXL4YD ST COT RAYON MICROPLEATED HIGHLY

## (undated) DEVICE — SUTURE VCRL SZ 2-0 L27IN ABSRB UD L26MM SH 1/2 CIR J417H

## (undated) DEVICE — APPLICATOR MEDICATED 26 CC SOLUTION HI LT ORNG CHLORAPREP

## (undated) DEVICE — Device

## (undated) DEVICE — T-DRAPE,EXTREMITY,STERILE: Brand: MEDLINE

## (undated) DEVICE — GARMENT,MEDLINE,DVT,INT,CALF,MED, GEN2: Brand: MEDLINE

## (undated) DEVICE — GLOVE SURG SZ 75 CRM LTX FREE POLYISOPRENE POLYMER BEAD ANTI

## (undated) DEVICE — SUTURE VCRL RAPIDE SZ 3-0 L18IN ABSRB UD PS-2 L19MM 3/8 CIR VR497

## (undated) DEVICE — 3M™ STERI-DRAPE™ U-DRAPE 1015: Brand: STERI-DRAPE™

## (undated) DEVICE — GLOVE SURG SZ 85 L12IN FNGR THK79MIL GRN LTX FREE

## (undated) DEVICE — HEWSON SUTURE RETRIEVER: Brand: HEWSON SUTURE RETRIEVER

## (undated) DEVICE — PADDING BNDG UNDERCAST 3.3 YDX3.9 IN N ABSORBENT WHT ARTFLX

## (undated) DEVICE — SUTURE VCRL SZ 0 L36IN ABSRB UD L36MM CT-1 1/2 CIR J946H